# Patient Record
Sex: MALE | Race: WHITE | Employment: UNEMPLOYED | URBAN - METROPOLITAN AREA
[De-identification: names, ages, dates, MRNs, and addresses within clinical notes are randomized per-mention and may not be internally consistent; named-entity substitution may affect disease eponyms.]

---

## 2017-01-13 ENCOUNTER — TELEPHONE (OUTPATIENT)
Dept: INTERNAL MEDICINE CLINIC | Age: 62
End: 2017-01-13

## 2017-01-17 ENCOUNTER — TELEPHONE (OUTPATIENT)
Dept: INTERNAL MEDICINE CLINIC | Age: 62
End: 2017-01-17

## 2017-01-17 NOTE — TELEPHONE ENCOUNTER
Writer received feedback from Dr. Mandeep Thao, the Ultram has been discontinued and Tylenol 500 mg q8 hours has been prescribed.

## 2017-02-06 ENCOUNTER — OFFICE VISIT (OUTPATIENT)
Dept: INTERNAL MEDICINE CLINIC | Age: 62
End: 2017-02-06

## 2017-02-06 VITALS
TEMPERATURE: 99.1 F | BODY MASS INDEX: 21.95 KG/M2 | RESPIRATION RATE: 16 BRPM | WEIGHT: 156.8 LBS | OXYGEN SATURATION: 97 % | HEIGHT: 71 IN | SYSTOLIC BLOOD PRESSURE: 129 MMHG | HEART RATE: 78 BPM | DIASTOLIC BLOOD PRESSURE: 59 MMHG

## 2017-02-06 DIAGNOSIS — I48.19 PERSISTENT ATRIAL FIBRILLATION (HCC): ICD-10-CM

## 2017-02-06 DIAGNOSIS — Z86.73 HISTORY OF CVA (CEREBROVASCULAR ACCIDENT): ICD-10-CM

## 2017-02-06 DIAGNOSIS — I25.5 ISCHEMIC CARDIOMYOPATHY: ICD-10-CM

## 2017-02-06 DIAGNOSIS — E11.59 DM TYPE 2 CAUSING VASCULAR DISEASE (HCC): Primary | ICD-10-CM

## 2017-02-06 DIAGNOSIS — M19.011 PRIMARY OSTEOARTHRITIS OF RIGHT SHOULDER: ICD-10-CM

## 2017-02-06 DIAGNOSIS — F32.9 REACTIVE DEPRESSION: ICD-10-CM

## 2017-02-06 RX ORDER — TRAMADOL HYDROCHLORIDE 50 MG/1
50 TABLET ORAL
Qty: 120 TAB | Refills: 5 | Status: SHIPPED | OUTPATIENT
Start: 2017-02-06 | End: 2017-03-07

## 2017-02-06 RX ORDER — TRAMADOL HYDROCHLORIDE 50 MG/1
TABLET ORAL
Refills: 5 | COMMUNITY
Start: 2016-12-30 | End: 2017-02-06 | Stop reason: SDUPTHER

## 2017-02-06 RX ORDER — METOPROLOL SUCCINATE 25 MG/1
25 TABLET, EXTENDED RELEASE ORAL DAILY
Qty: 90 TAB | Refills: 1 | Status: SHIPPED | OUTPATIENT
Start: 2017-02-06 | End: 2017-05-08 | Stop reason: SDUPTHER

## 2017-02-06 NOTE — PROGRESS NOTES
HISTORY OF PRESENT ILLNESS  Shawn Dunlap is a 59 y.o. male. HPI  Here for dm. He is well controlled only using meds three times weekly. He has been depressed but elected not to use celexa. He needs a refill. He has right shoulder pain using two tramadol daily. He has aphasia from a cva and caretaker provides much of the history. His appetite is poor and he has lost weight. Past Medical History   Diagnosis Date    AF (atrial fibrillation) (HCC)     CAD (coronary artery disease)     Cardiomyopathy (HCC)     Diabetes (HCC)      type 2    Hypercholesterolemia     Memory loss     Swallowing difficulty      Current Outpatient Prescriptions   Medication Sig    traMADol (ULTRAM) 50 mg tablet Take 1 Tab by mouth every six (6) hours as needed for Pain. Max Daily Amount: 200 mg.    metoprolol succinate (TOPROL-XL) 25 mg XL tablet Take 1 Tab by mouth daily.  magnesium oxide (MAG-OX) 400 mg tablet TAKE 1 TAB BY MOUTH DAILY.  warfarin (COUMADIN) 7.5 mg tablet TAKE 1 TAB BY MOUTH DAILY.  glucose blood VI test strips (CONTOUR NEXT STRIPS) strip E11.9 daily    aspirin delayed-release 81 mg tablet Take 81 mg by mouth every evening.  digoxin (LANOXIN) 0.25 mg tablet Take 0.25 mg by mouth daily.  metFORMIN ER (GLUCOPHAGE XR) 500 mg tablet Take 1 Tab by mouth daily (after breakfast).  clotrimazole-betamethasone (LOTRISONE) topical cream Apply  to affected area daily as needed for Skin Irritation (on feet).  digoxin (LANOXIN) 0.25 mg tablet TAKE 1 TAB BY MOUTH DAILY.  lidocaine (LIDODERM) 5 % Apply patch to the affected area for 12 hours a day and remove for 12 hours a day.  fluticasone (FLONASE) 50 mcg/actuation nasal spray 1-2 Sprays by Both Nostrils route daily as needed for Rhinitis. No current facility-administered medications for this visit. Review of Systems   All other systems reviewed and are negative.     Visit Vitals    /59 (BP 1 Location: Left arm, BP Patient Position: Sitting)    Pulse 78    Temp 99.1 °F (37.3 °C) (Axillary)    Resp 16    Ht 5' 11\" (1.803 m)    Wt 156 lb 12.8 oz (71.1 kg)    SpO2 97%    BMI 21.87 kg/m2       Physical Exam   Constitutional: He appears well-developed and well-nourished. Neurological: He is alert. Smiles but no verbalizations. Psychiatric: He has a normal mood and affect. Nursing note and vitals reviewed. ASSESSMENT and PLAN  Diagnoses and all orders for this visit:    DM type 2 causing vascular disease ([facility] [address]  -     METABOLIC PANEL, COMPREHENSIVE  -     HEMOGLOBIN A1C WITH EAG  The current medical regimen is effective;  continue present plan and medications. Persistent atrial fibrillation (HCC)  -     PROTHROMBIN TIME + INR; Future  -     metoprolol succinate (TOPROL-XL) 25 mg XL tablet; Take 1 Tab by mouth daily. History of CVA (cerebrovascular accident)  Significant residual neurologic impairment. Ischemic cardiomyopathy    Reactive depression  Consider starting celexa with weight loss. Primary osteoarthritis of right shoulder  -     traMADol (ULTRAM) 50 mg tablet; Take 1 Tab by mouth every six (6) hours as needed for Pain.  Max Daily Amount: 200 mg.  -     REFERRAL TO ORTHOPEDIC SURGERY    Reviewed plan of care with the patient who has provided input and agrees with the goals

## 2017-02-06 NOTE — MR AVS SNAPSHOT
Visit Information Date & Time Provider Department Dept. Phone Encounter #  
 2/6/2017 11:00 AM Kt Garber MD [facility] [phone] [id number] Your Appointments 3/1/2017  1:00 PM  
ESTABLISHED PATIENT with Gilberto Gonsalez MD  
[facility] ([address]) Appt Note: 6 mo fu appt [address] [facility] [address] [facility] [address]  
[phone]  
  
   
 [address] [facility] [address]  
  
    
 3/1/2017  1:15 PM  
PACEMAKER with PACEMAKER, STFRANCES  
[facility] (ANTHONY SCHEDULING) Appt Note: ford sci icd/stf/miguel angel also [address] [facility] [address] [facility] [address]  
[phone]  
  
   
 [address] [facility] [address] [facility] [address]  
  
    
 6/28/2017 10:40 AM  
ESTABLISHED PATIENT with Tommy Cooper MD  
[facility] (ANTHONY SCHEDULING) Appt Note: ANNUAL; ANNUAL  
 [address] [facility] [address] [place] [address]  
[address] [facility] [place] [address] [address] Upcoming Health Maintenance Date Due Hepatitis C Screening 1955 FOOT EXAM Q1 4/25/1963 EYE EXAM RETINAL OR DILATED Q1 4/25/1963 COLONOSCOPY 4/25/1971 DTaP/Tdap/Td series (1 - Tdap) 4/25/1974 ZOSTER VACCINE AGE 60> 4/25/2013 MICROALBUMIN Q1 6/2/2015 HEMOGLOBIN A1C Q6M 4/4/2017 LIPID PANEL Q1 10/4/2017 Allergies as of 2/6/2017  Review Complete On: 2/6/2017 By: Logan Escalante LPN Severity Noted Reaction Type Reactions Lipitor [Atorvastatin]  10/04/2016    Myalgia Current Immunizations  Reviewed on 10/4/2016 Name Date Influenza Vaccine 10/4/2016, 11/18/2015 Influenza Vaccine (Quad) PF 11/18/2015 11:33 AM  
 Pneumococcal Polysaccharide (PPSV-23) 3/30/2016 12:41 PM  
  
 Not reviewed this visit You Were Diagnosed With   
  
 Codes Comments DM type 2 causing vascular disease ([facility] [address]    -  Primary ICD-10-CM: E11.59 
ICD-9-CM: 250.70, 443.81 Persistent atrial fibrillation (HCC)     ICD-10-CM: I48.1 ICD-9-CM: 427.31 History of CVA (cerebrovascular accident)     ICD-10-CM: Z86.73 
ICD-9-CM: V12.54 Ischemic cardiomyopathy     ICD-10-CM: I25.5 ICD-9-CM: 414.8 Reactive depression     ICD-10-CM: F32.9 ICD-9-CM: 300.4 Primary osteoarthritis of right shoulder     ICD-10-CM: M19.011 
ICD-9-CM: 715.11 Vitals BP Pulse Temp Resp Height(growth percentile) 129/59 (BP 1 Location: Left arm, BP Patient Position: Sitting) 78 99.1 °F (37.3 °C) (Axillary) 16 5' 11\" (1.803 m) Weight(growth percentile) SpO2 BMI Smoking Status 156 lb 12.8 oz (71.1 kg) 97% 21.87 kg/m2 Former Smoker Vitals History BMI and BSA Data Body Mass Index Body Surface Area  
 21.87 kg/m 2 1.89 m 2 Preferred Pharmacy Pharmacy Name Phone [facility] [address] [phone] Your Updated Medication List  
  
   
This list is accurate as of: 2/6/17 11:37 AM.  Always use your most recent med list.  
  
  
  
  
 aspirin delayed-release 81 mg tablet Take 81 mg by mouth every evening. clotrimazole-betamethasone topical cream  
Commonly known as:  Rosilyn Scarlet Apply  to affected area daily as needed for Skin Irritation (on feet). * digoxin 0.25 mg tablet Commonly known as:  LANOXIN Take 0.25 mg by mouth daily. * digoxin 0.25 mg tablet Commonly known as:  LANOXIN  
TAKE 1 TAB BY MOUTH DAILY. fluticasone 50 mcg/actuation nasal spray Commonly known as:  FLONASE  
1-2 Sprays by Both Nostrils route daily as needed for Rhinitis. glucose blood VI test strips strip Commonly known as:  CONTOUR NEXT STRIPS  
E11.9 daily  
  
 lidocaine 5 % Commonly known as:  Rebecca Hilliard Apply patch to the affected area for 12 hours a day and remove for 12 hours a day. magnesium oxide 400 mg tablet Commonly known as:  MAG-OX  
TAKE 1 TAB BY MOUTH DAILY. metFORMIN  mg tablet Commonly known as:  GLUCOPHAGE XR Take 1 Tab by mouth daily (after breakfast). metoprolol succinate 25 mg XL tablet Commonly known as:  TOPROL-XL Take 1 Tab by mouth daily. traMADol 50 mg tablet Commonly known as:  ULTRAM  
Take 1 Tab by mouth every six (6) hours as needed for Pain. Max Daily Amount: 200 mg.  
  
 warfarin 7.5 mg tablet Commonly known as:  COUMADIN  
TAKE 1 TAB BY MOUTH DAILY. * Notice: This list has 2 medication(s) that are the same as other medications prescribed for you. Read the directions carefully, and ask your doctor or other care provider to review them with you. Prescriptions Printed Refills  
 traMADol (ULTRAM) 50 mg tablet 5 Sig: Take 1 Tab by mouth every six (6) hours as needed for Pain. Max Daily Amount: 200 mg. Class: Print Route: Oral  
  
Prescriptions Sent to Pharmacy Refills  
 metoprolol succinate (TOPROL-XL) 25 mg XL tablet 1 Sig: Take 1 Tab by mouth daily. Class: Normal  
 Pharmacy: [place], [place] - [address] Ph #: [phone] Route: Oral  
  
We Performed the Following HEMOGLOBIN A1C WITH EAG [61690 CPT(R)] METABOLIC PANEL, COMPREHENSIVE [24716 CPT(R)] REFERRAL TO ORTHOPEDIC SURGERY [REF62 Custom] Comments:  
 Please evaluate patient for shoulder. To-Do List   
 02/06/2017 Lab:  PROTHROMBIN TIME + INR Referral Information Referral ID Referred By Referred To  
  
 [id number] [facility]   
   [address] [facility] [address] [place], [facility] Manisha Delcid Visits Status Start Date End Date 1 New Request 2/6/17 2/6/18 If your referral has a status of pending review or denied, additional information will be sent to support the outcome of this decision. Introducing [facility]! Tyra Garay introduces Webspy patient portal. Now you can access parts of your medical record, email your doctor's office, and request medication refills online. 1. In your internet browser, go to [email or web address] 2. Click on the First Time User? Click Here link in the Sign In box. You will see the New Member Sign Up page. 3. Enter your Webspy Access Code exactly as it appears below. You will not need to use this code after youve completed the sign-up process. If you do not sign up before the expiration date, you must request a new code. · Webspy Access Code: [email or web address] Expires: 5/7/2017 11:37 AM 
 
4. Enter the last four digits of your Social Security Number (xxxx) and Date of Birth (mm/dd/yyyy) as indicated and click Submit. You will be taken to the next sign-up page. 5. Create a Webspy ID. This will be your Webspy login ID and cannot be changed, so think of one that is secure and easy to remember. 6. Create a Webspy password. You can change your password at any time. 7. Enter your Password Reset Question and Answer. This can be used at a later time if you forget your password. 8. Enter your e-mail address. You will receive e-mail notification when new information is available in [address] 9. Click Sign Up. You can now view and download portions of your medical record. 10. Click the Download Summary menu link to download a portable copy of your medical information. If you have questions, please visit the Frequently Asked Questions section of the Webspy website. Remember, Webspy is NOT to be used for urgent needs. For medical emergencies, dial 911. Now available from your iPhone and Android! Please provide this summary of care documentation to your next provider. Your primary care clinician is listed as [address] [place] [address] [address]. If you have any questions after today's visit, please call [phone].

## 2017-02-07 LAB
ALBUMIN SERPL-MCNC: 3.7 G/DL (ref 3.6–4.8)
ALBUMIN/GLOB SERPL: 1.1 {RATIO} (ref 1.1–2.5)
ALP SERPL-CCNC: 44 IU/L (ref 39–117)
ALT SERPL-CCNC: 10 IU/L (ref 0–44)
AST SERPL-CCNC: 19 IU/L (ref 0–40)
BILIRUB SERPL-MCNC: 0.8 MG/DL (ref 0–1.2)
BUN SERPL-MCNC: 10 MG/DL (ref 8–27)
BUN/CREAT SERPL: 14 (ref 10–22)
CALCIUM SERPL-MCNC: 8.8 MG/DL (ref 8.6–10.2)
CHLORIDE SERPL-SCNC: 100 MMOL/L (ref 96–106)
CO2 SERPL-SCNC: 27 MMOL/L (ref 18–29)
CREAT SERPL-MCNC: 0.74 MG/DL (ref 0.76–1.27)
EST. AVERAGE GLUCOSE BLD GHB EST-MCNC: 137 MG/DL
GLOBULIN SER CALC-MCNC: 3.4 G/DL (ref 1.5–4.5)
GLUCOSE SERPL-MCNC: 112 MG/DL (ref 65–99)
HBA1C MFR BLD: 6.4 % (ref 4.8–5.6)
INR PPP: 3.7 (ref 0.8–1.2)
POTASSIUM SERPL-SCNC: 3.9 MMOL/L (ref 3.5–5.2)
PROT SERPL-MCNC: 7.1 G/DL (ref 6–8.5)
PROTHROMBIN TIME: 37.9 SEC (ref 9.1–12)
SODIUM SERPL-SCNC: 143 MMOL/L (ref 134–144)

## 2017-02-07 NOTE — PROGRESS NOTES
DM perfect. Coumadin too high. Hold three doses then go 1/2 tab MWF whole tab other days.  See me two weeks to retest.

## 2017-02-08 NOTE — PROGRESS NOTES
Writer spoke with patients wife and notified her Per Dr Deanna Brown. Coumadin too high. Hold three doses then go 1/2 tab MWF whole tab other days.  See in the office in two weeks to retest. Patient wife expressed verbal understanding

## 2017-02-24 ENCOUNTER — TELEPHONE (OUTPATIENT)
Dept: INTERNAL MEDICINE CLINIC | Age: 62
End: 2017-02-24

## 2017-02-24 NOTE — TELEPHONE ENCOUNTER
Would like to have order for home PT for leg/arm weakness. Evaluation and treat. States he can hardly get around now. Would like [facility] to [facility] please.  Phone number [phone]

## 2017-02-24 NOTE — TELEPHONE ENCOUNTER
Please call Annah Sicard  About getting the pt requesting order for PT from  [facility]    [phone]    Annah Sicard # [phone]

## 2017-02-25 ENCOUNTER — APPOINTMENT (OUTPATIENT)
Dept: GENERAL RADIOLOGY | Age: 62
End: 2017-02-25
Attending: EMERGENCY MEDICINE
Payer: MEDICAID

## 2017-02-25 ENCOUNTER — HOSPITAL ENCOUNTER (EMERGENCY)
Age: 62
Discharge: HOME | End: 2017-02-25
Attending: EMERGENCY MEDICINE
Payer: MEDICAID

## 2017-02-25 VITALS
HEIGHT: 71 IN | WEIGHT: 155 LBS | RESPIRATION RATE: 20 BRPM | TEMPERATURE: 97.5 F | BODY MASS INDEX: 21.7 KG/M2 | DIASTOLIC BLOOD PRESSURE: 81 MMHG | OXYGEN SATURATION: 100 % | SYSTOLIC BLOOD PRESSURE: 110 MMHG | HEART RATE: 102 BPM

## 2017-02-25 DIAGNOSIS — L03.114 CELLULITIS OF LEFT UPPER EXTREMITY: Primary | ICD-10-CM

## 2017-02-25 LAB
ALBUMIN SERPL BCP-MCNC: 3.7 G/DL (ref 3.5–5)
ALBUMIN/GLOB SERPL: 0.7 {RATIO} (ref 1.1–2.2)
ALP SERPL-CCNC: 53 U/L (ref 45–117)
ALT SERPL-CCNC: 21 U/L (ref 12–78)
ANION GAP BLD CALC-SCNC: 11 MMOL/L (ref 5–15)
AST SERPL W P-5'-P-CCNC: 23 U/L (ref 15–37)
BASOPHILS # BLD AUTO: 0 K/UL (ref 0–0.1)
BASOPHILS # BLD: 0 % (ref 0–1)
BILIRUB SERPL-MCNC: 0.9 MG/DL (ref 0.2–1)
BUN SERPL-MCNC: 20 MG/DL (ref 6–20)
BUN/CREAT SERPL: 24 (ref 12–20)
CALCIUM SERPL-MCNC: 8.9 MG/DL (ref 8.5–10.1)
CHLORIDE SERPL-SCNC: 100 MMOL/L (ref 97–108)
CO2 SERPL-SCNC: 27 MMOL/L (ref 21–32)
CREAT SERPL-MCNC: 0.84 MG/DL (ref 0.7–1.3)
CRP SERPL-MCNC: 4.76 MG/DL
EOSINOPHIL # BLD: 0 K/UL (ref 0–0.4)
EOSINOPHIL NFR BLD: 1 % (ref 0–7)
ERYTHROCYTE [DISTWIDTH] IN BLOOD BY AUTOMATED COUNT: 12.9 % (ref 11.5–14.5)
ERYTHROCYTE [SEDIMENTATION RATE] IN BLOOD: 43 MM/HR (ref 0–20)
GLOBULIN SER CALC-MCNC: 5.1 G/DL (ref 2–4)
GLUCOSE SERPL-MCNC: 136 MG/DL (ref 65–100)
HCT VFR BLD AUTO: 42.3 % (ref 36.6–50.3)
HGB BLD-MCNC: 14.3 G/DL (ref 12.1–17)
LYMPHOCYTES # BLD AUTO: 18 % (ref 12–49)
LYMPHOCYTES # BLD: 1.3 K/UL (ref 0.8–3.5)
MCH RBC QN AUTO: 30.2 PG (ref 26–34)
MCHC RBC AUTO-ENTMCNC: 33.8 G/DL (ref 30–36.5)
MCV RBC AUTO: 89.4 FL (ref 80–99)
MONOCYTES # BLD: 0.7 K/UL (ref 0–1)
MONOCYTES NFR BLD AUTO: 10 % (ref 5–13)
NEUTS SEG # BLD: 5.3 K/UL (ref 1.8–8)
NEUTS SEG NFR BLD AUTO: 71 % (ref 32–75)
PLATELET # BLD AUTO: 165 K/UL (ref 150–400)
POTASSIUM SERPL-SCNC: 4.6 MMOL/L (ref 3.5–5.1)
PROT SERPL-MCNC: 8.8 G/DL (ref 6.4–8.2)
RBC # BLD AUTO: 4.73 M/UL (ref 4.1–5.7)
SODIUM SERPL-SCNC: 138 MMOL/L (ref 136–145)
WBC # BLD AUTO: 7.4 K/UL (ref 4.1–11.1)

## 2017-02-25 PROCEDURE — 74011000250 HC RX REV CODE- 250: Performed by: EMERGENCY MEDICINE

## 2017-02-25 PROCEDURE — 96365 THER/PROPH/DIAG IV INF INIT: CPT

## 2017-02-25 PROCEDURE — 85025 COMPLETE CBC W/AUTO DIFF WBC: CPT | Performed by: EMERGENCY MEDICINE

## 2017-02-25 PROCEDURE — 74011250636 HC RX REV CODE- 250/636: Performed by: EMERGENCY MEDICINE

## 2017-02-25 PROCEDURE — 74011000258 HC RX REV CODE- 258: Performed by: EMERGENCY MEDICINE

## 2017-02-25 PROCEDURE — 99283 EMERGENCY DEPT VISIT LOW MDM: CPT

## 2017-02-25 PROCEDURE — 85652 RBC SED RATE AUTOMATED: CPT | Performed by: EMERGENCY MEDICINE

## 2017-02-25 PROCEDURE — 86140 C-REACTIVE PROTEIN: CPT | Performed by: EMERGENCY MEDICINE

## 2017-02-25 PROCEDURE — 80053 COMPREHEN METABOLIC PANEL: CPT | Performed by: EMERGENCY MEDICINE

## 2017-02-25 PROCEDURE — 36415 COLL VENOUS BLD VENIPUNCTURE: CPT | Performed by: EMERGENCY MEDICINE

## 2017-02-25 PROCEDURE — 75810000289 HC I&D ABSCESS SIMP/COMP/MULT

## 2017-02-25 PROCEDURE — 73090 X-RAY EXAM OF FOREARM: CPT

## 2017-02-25 RX ORDER — CEPHALEXIN 500 MG/1
500 CAPSULE ORAL 4 TIMES DAILY
Qty: 28 CAP | Refills: 0 | Status: SHIPPED | OUTPATIENT
Start: 2017-02-25 | End: 2017-03-04

## 2017-02-25 RX ORDER — BUPIVACAINE HYDROCHLORIDE 5 MG/ML
5 INJECTION, SOLUTION EPIDURAL; INTRACAUDAL ONCE
Status: COMPLETED | OUTPATIENT
Start: 2017-02-25 | End: 2017-02-25

## 2017-02-25 RX ADMIN — CEFAZOLIN SODIUM 2 G: 1 INJECTION, POWDER, FOR SOLUTION INTRAMUSCULAR; INTRAVENOUS at 21:01

## 2017-02-25 RX ADMIN — SODIUM CHLORIDE 1000 ML: 900 INJECTION, SOLUTION INTRAVENOUS at 21:01

## 2017-02-25 RX ADMIN — BUPIVACAINE HYDROCHLORIDE 25 MG: 5 INJECTION, SOLUTION EPIDURAL; INTRACAUDAL at 21:01

## 2017-02-25 NOTE — Clinical Note
We hope that we have addressed all of your medical concerns. The examination and treatment you received in the Emergency Department were for an emergent problem and were not intended as complete care. It is important that you follow up with your [facility] provider(s) for ongoing care. If your symptoms worsen or do not improve as expected, and you are unable to reach your usual health care provider(s), you should return to the Emergency Department. Today's healthcare is undergoing tremendous  change, and patient satisfaction surveys are one of the many tools to assess the quality of medical care. You may receive a survey from the [employer] regarding your experience in the Emergency Department. I hope that your experience has  been completely positive, particularly the medical care that I provided. As such, please participate in the survey; anything less than excellent does not meet my expectations or intentions. [facility] participate in nationally recognized quality of care measures. If your blood pressure is greater than 120/80, as reported below, we urge that you seek medical care to address the potential of high blood pressure, commonly known as hypertensio n. Hypertension can be hereditary or can be caused by certain medical conditions, pain, stress, or \"white coat syndrome. \" Please make an appointment with your health care provider(s) for follow up of your Emergency Department visit. VITALS:   
Empty flowsheet group. Thank you for allowing us to provide you with medical care today. We realize that you have many choices for your emergency care needs. Please choose us in the future for any continued health care needs. Alexis Watson Epley, [address]  
Office: [phone] @[facility]NT([facility])@ @[place] [address]@

## 2017-02-26 NOTE — ED PROVIDER NOTES
HPI Comments: 59 y.o. male with past medical history significant for Afib, CAD, Stroke, Aphasia, Dysphagia who presents from home accompanied by significant other with chief complaint of abscess. Pt aphasic s/p CVA, hx provided by significant other. Per significant other, pt with left arm swelling x \"awhile\" with noted abscess to forearm. Pt has been scratching significantly. + Blood thinners. Tetanus UTD. Significant other denies for patient: fever, chills, nausea or vomiting. There are no other acute medical concerns at this time. Full history, physical exam, and ROS unable to be obtained due to:  Aphasia. PCP: Rosamaria Rodas MD    Note written by Sivan Chavez, as dictated by Kiran Adams MD 7:43 PM    The history is provided by a significant other. The history is limited by the condition of the patient. No  was used. Past Medical History:   Diagnosis Date    AF (atrial fibrillation) (HCC)     CAD (coronary artery disease)     Cardiomyopathy ([facility] [address]     Diabetes ([facility] [address]     type 2    Hypercholesterolemia     Memory loss     Swallowing difficulty        Past Surgical History:   Procedure Laterality Date    CARDIAC SURG PROCEDURE UNLIST  2006    ablation    HX HEART CATHETERIZATION  2005    diffuse 40-50%    HX PACEMAKER      HX PACEMAKER PLACEMENT Left March 2016    HX UROLOGICAL      circumcision    HX WISDOM TEETH EXTRACTION  1980s    3 removed         Family History:   Problem Relation Age of Onset    Diabetes Father     Neuropathy Father     Heart Disease Father     Cancer Father     Stroke Father     Hypertension Sister     Stroke Mother        Social History     Social History    Marital status: SINGLE     Spouse name: N/A    Number of children: N/A    Years of education: N/A     Occupational History    Not on file.      Social History Main Topics    Smoking status: Former Smoker     Packs/day: 0.25     Years: 3.00     Quit date: 1/1/1972  Smokeless tobacco: Never Used    Alcohol use No    Drug use: No    Sexual activity: Yes     Other Topics Concern    Not on file     Social History Narrative         ALLERGIES: Lipitor [atorvastatin]    Review of Systems   Reason unable to perform ROS: Aphasia. Vitals:    02/25/17 1918   BP: 132/80   Pulse: (!) 102   Resp: 20   Temp: 97.5 °F (36.4 °C)   SpO2: 97%   Weight: 70.3 kg (155 lb)   Height: 5' 11\" (1.803 m)            Physical Exam   Constitutional: He appears well-developed and well-nourished. No distress. Non-verbal after stroke, noted abscess/ cellulitic area to L forearm;    HENT:   Head: Normocephalic and atraumatic. Nose: Nose normal.   Mouth/Throat: Oropharynx is clear and moist.   Eyes: Right eye exhibits no discharge. Left eye exhibits no discharge. Keeps his eyes closed; Neck: Normal range of motion. Neck supple. Cardiovascular: Regular rhythm, normal heart sounds and intact distal pulses. Exam reveals no gallop and no friction rub. No murmur heard. Just tachardic   Pulmonary/Chest: Effort normal and breath sounds normal. No respiratory distress. He has no wheezes. He has no rales. He exhibits no tenderness. Abdominal: Soft. Bowel sounds are normal. He exhibits no distension and no mass. There is no tenderness. There is no rebound and no guarding. No wincing w/ palpation;    Musculoskeletal: Normal range of motion. He exhibits no edema. Lymphadenopathy:     He has no cervical adenopathy. Skin: Skin is warm and dry. Rash noted. No erythema. Nursing note and vitals reviewed. [facility]  ED Course       Procedures          Procedure Note - Incision and Drainage:   8:31 PM  Performed by: Thor Alba MD  Complexity: simple  Skin prepped with Betadine. Sterile field established. Anesthesia achieved with 4 mLs of Bupivacaine 0.5% without epinephrine using a local infiltration.    Abscess to arm: left was incised with # 11 blade, and 1mLs of mucopurulent drainage was expressed. Wound probed and irrigated. Area was not packed. Sterile dressing applied. Estimated blood loss: 3cc  The procedure took 1-15 minutes, and pt tolerated well. 9:05 PM discussed results w/ sig other/ sister; they agree w/ plans; Louie DI Alvarado's  results have been reviewed with him. He has been counseled regarding his diagnosis. He verbally conveys understanding and agreement of the signs, symptoms, diagnosis, treatment and prognosis and additionally agrees to Call/ Arrange follow up as recommended with Dr. Parker Reid MD in 24 - 48 hours. He also agrees with the care-plan and conveys that all of his questions have been answered. I have also put together some discharge instructions for him that include: 1) educational information regarding their diagnosis, 2) how to care for their diagnosis at home, as well a 3) list of reasons why they would want to return to the ED prior to their follow-up appointment, should their condition change or for concerns.

## 2017-02-26 NOTE — DISCHARGE INSTRUCTIONS
Cellulitis: Care Instructions  Your Care Instructions    Cellulitis is a skin infection. It often occurs after a break in the skin from a scrape, cut, bite, or puncture, or after a rash. The doctor has checked you carefully, but problems can develop later. If you notice any problems or new symptoms, get medical treatment right away. Follow-up care is a key part of your treatment and safety. Be sure to make and go to all appointments, and call your doctor if you are having problems. It's also a good idea to know your test results and keep a list of the medicines you take. How can you care for yourself at home? · Take your antibiotics as directed. Do not stop taking them just because you feel better. You need to take the full course of antibiotics. · Prop up the infected area on pillows to reduce pain and swelling. Try to keep the area above the level of your heart as often as you can. · If your doctor told you how to care for your wound, follow your doctor's instructions. If you did not get instructions, follow this general advice:  ¨ Wash the wound with clean water 2 times a day. Don't use hydrogen peroxide or alcohol, which can slow healing. ¨ You may cover the wound with a thin layer of petroleum jelly, such as Vaseline, and a nonstick bandage. ¨ Apply more petroleum jelly and replace the bandage as needed. · Be safe with medicines. Take pain medicines exactly as directed. ¨ If the doctor gave you a prescription medicine for pain, take it as prescribed. ¨ If you are not taking a prescription pain medicine, ask your doctor if you can take an over-the-counter medicine. To prevent cellulitis in the future  · Try to prevent cuts, scrapes, or other injuries to your skin. Cellulitis most often occurs where there is a break in the skin. · If you get a scrape, cut, mild burn, or bite, wash the wound with clean water as soon as you can to help avoid infection.  Don't use hydrogen peroxide or alcohol, which can slow healing. · If you have swelling in your legs (edema), support stockings and good skin care may help prevent leg sores and cellulitis. · Take care of your feet, especially if you have diabetes or other conditions that increase the risk of infection. Wear shoes and socks. Do not go barefoot. If you have athlete's foot or other skin problems on your feet, talk to your doctor about how to treat them. When should you call for help? Call your doctor now or seek immediate medical care if:  · You have signs that your infection is getting worse, such as:  ¨ Increased pain, swelling, warmth, or redness. ¨ Red streaks leading from the area. ¨ Pus draining from the area. ¨ A fever. · You get a rash. Watch closely for changes in your health, and be sure to contact your doctor if:  · You are not getting better after 1 day (24 hours). · You do not get better as expected. Where can you learn more? Go to http://brigida-thomas.info/. Costa Sawyer in the search box to learn more about \"Cellulitis: Care Instructions. \"  Current as of: February 5, 2016  Content Version: 11.1  © 6563-0772 [employer]. Care instructions adapted under license by [facility] (which disclaims liability or warranty for this information). If you have questions about a medical condition or this instruction, always ask your healthcare professional. [place] [address] any warranty or liability for your use of this information. We hope that we have addressed all of your medical concerns. The examination and treatment you received in the Emergency Department were for an emergent problem and were not intended as complete care. It is important that you follow up with your healthcare provider(s) for ongoing care. If your symptoms worsen or do not improve as expected, and you are unable to reach your usual health care provider(s), you should return to the Emergency Department. Today's healthcare is undergoing tremendous change, and patient satisfaction surveys are one of the many tools to assess the quality of medical care. You may receive a survey from the [facility] regarding your experience in the Emergency Department. I hope that your experience has been completely positive, particularly the medical care that I provided. As such, please participate in the survey; anything less than excellent does not meet my expectations or intentions. [facility] and [facility] participate in nationally recognized quality of care measures. If your blood pressure is greater than 120/80, as reported below, we urge that you seek medical care to address the potential of high blood pressure, commonly known as hypertension. Hypertension can be hereditary or can be caused by certain medical conditions, pain, stress, or \"white coat syndrome. \"       Please make an appointment with your health care provider(s) for follow up of your Emergency Department visit. VITALS:   Patient Vitals for the past 8 hrs:   Temp Pulse Resp BP SpO2   02/25/17 1918 97.5 °F (36.4 °C) (!) 102 20 132/80 97 %          Thank you for allowing us to provide you with medical care today. We realize that you have many choices for your emergency care needs. Please choose us in the future for any continued health care needs. [address], [address] [facility].   Office: [phone]            Recent Results (from the past 24 hour(s))   CBC WITH AUTOMATED DIFF    Collection Time: 02/25/17  7:58 PM   Result Value Ref Range    WBC 7.4 4.1 - 11.1 K/uL    RBC 4.73 4.10 - 5.70 M/uL    HGB 14.3 12.1 - 17.0 g/dL    HCT 42.3 36.6 - 50.3 %    MCV 89.4 80.0 - 99.0 FL    MCH 30.2 26.0 - 34.0 PG    MCHC 33.8 30.0 - 36.5 g/dL    RDW 12.9 11.5 - 14.5 %    PLATELET 364 640 - 589 K/uL    NEUTROPHILS 71 32 - 75 %    LYMPHOCYTES 18 12 - 49 %    MONOCYTES 10 5 - 13 %    EOSINOPHILS 1 0 - 7 %    BASOPHILS 0 0 - 1 %    ABS. NEUTROPHILS 5.3 1.8 - 8.0 K/UL    ABS. LYMPHOCYTES 1.3 0.8 - 3.5 K/UL    ABS. MONOCYTES 0.7 0.0 - 1.0 K/UL    ABS. EOSINOPHILS 0.0 0.0 - 0.4 K/UL    ABS. BASOPHILS 0.0 0.0 - 0.1 K/UL   METABOLIC PANEL, COMPREHENSIVE    Collection Time: 02/25/17  7:58 PM   Result Value Ref Range    Sodium 138 136 - 145 mmol/L    Potassium 4.6 3.5 - 5.1 mmol/L    Chloride 100 97 - 108 mmol/L    CO2 27 21 - 32 mmol/L    Anion gap 11 5 - 15 mmol/L    Glucose 136 (H) 65 - 100 mg/dL    BUN 20 6 - 20 MG/DL    Creatinine 0.84 0.70 - 1.30 MG/DL    BUN/Creatinine ratio 24 (H) 12 - 20      GFR est AA >60 >60 ml/min/1.73m2    GFR est non-AA >60 >60 ml/min/1.73m2    Calcium 8.9 8.5 - 10.1 MG/DL    Bilirubin, total 0.9 0.2 - 1.0 MG/DL    ALT (SGPT) 21 12 - 78 U/L    AST (SGOT) 23 15 - 37 U/L    Alk. phosphatase 53 45 - 117 U/L    Protein, total 8.8 (H) 6.4 - 8.2 g/dL    Albumin 3.7 3.5 - 5.0 g/dL    Globulin 5.1 (H) 2.0 - 4.0 g/dL    A-G Ratio 0.7 (L) 1.1 - 2.2     C REACTIVE PROTEIN, QT    Collection Time: 02/25/17  7:58 PM   Result Value Ref Range    C-Reactive protein 4.76 (H) <0.60 mg/dL   SED RATE (ESR)    Collection Time: 02/25/17  7:58 PM   Result Value Ref Range    Sed rate, automated 43 (H) 0 - 20 mm/hr       Xr Forearm Lt Ap/lat    Result Date: 2/25/2017  EXAM:  XR FOREARM LT AP/LAT INDICATION:   infxn/ r/o free air. Left arm swelling, history of abscesses COMPARISON: None. FINDINGS: Two views of the left radius and ulna demonstrate no fracture or dislocation. There is soft tissue swelling. No evidence of subcutaneous emphysema. .     IMPRESSION:  Soft tissue swelling.

## 2017-02-26 NOTE — ED TRIAGE NOTES
Patient arrives with family with c/o LEFT arm swelling, per family patient has a hx of abscesses, patient has had this abscess on his arm for 1 week now and its not healing.

## 2017-02-27 ENCOUNTER — PATIENT OUTREACH (OUTPATIENT)
Dept: INTERNAL MEDICINE CLINIC | Age: 62
End: 2017-02-27

## 2017-02-27 ENCOUNTER — TELEPHONE (OUTPATIENT)
Dept: INTERNAL MEDICINE CLINIC | Age: 62
End: 2017-02-27

## 2017-02-27 NOTE — TELEPHONE ENCOUNTER
Notified Danyelle Rodas that Dr Vadim Olsen would like to see the patient this week for an INR check since he is on an antibiotic following an ER visit. She states that she is unable to get him in this week since she needs help. He is on the schedule for 03/07/17. The patient has home health coming out on Wednesday. Spoke with [facility] and they can complete an INR. Faxed order to [phone] per Dr Vadim Olsen and notified Danyelle Rodas.

## 2017-02-27 NOTE — PROGRESS NOTES
NNTOCED    Patient is listed on  Cali Abbott report for ED visit to [facility]  for left arm abscess. I&D performed and discharged with Rx for Keflex. According to Dr. Princess Reyna note on , he would like patient to be seen this week for f/u and PT/INR. Contacted patient's girlfriend/POA Fabricio  and introduced self and NN role. Verified patient's . She reports she has spoken to someone else in this office today and told her that she is unable to bring the patient in this week. Confirmed appointment next Tuesday 3/7 at 10:15 am. Reports whomever she spoke with was going to contact the [facility] Yonatan [facility], [facility], to set up home PT/INR check this week. She confirmed that the patient is taking Keflex liquid form QID. He is doing okay and reports cause of abscess is MRSA. She performed wound care today according to discharge instructions. Washed with soap and water and applied a bandage. Reports she was told to allow the wound to scab over. Advised her NN will follow up with whomever she spoke with in this office and ensure that [facility] is going to draw PT/INR. She verbalized understanding. After call ended, discussed patient with Elena Goldstein, as she spoke to Lauren. Confirmed that she obtained verbal order from Dr. Willow Layne to have home health draw PT/INR, so she will notify home health. Plan to follow up with patient and Lauren at appointment next week to assess for any further needs. According to notes in the chart, patient has Medicaid care aides for 35 hours per week. [place] and her sister assist the patient with other care.

## 2017-03-01 ENCOUNTER — TELEPHONE (OUTPATIENT)
Dept: INTERNAL MEDICINE CLINIC | Age: 62
End: 2017-03-01

## 2017-03-01 NOTE — TELEPHONE ENCOUNTER
Writer spoke with Cady Tirado and notified her Per Dr Veronica Minaya to half tab twice weekly and test one week. Whole tab five days weekly.  Cady Tirado understood

## 2017-03-01 NOTE — TELEPHONE ENCOUNTER
Called Talya and verified that the INR is 1.5. Patient is currently taking 7.5 mg coumadin, except on Monday, Wednesday, and Friday he takes half tab of 7.5 mg. Needs to know if any adjustment needs to be made. Call back number [phone].

## 2017-03-07 ENCOUNTER — OFFICE VISIT (OUTPATIENT)
Dept: INTERNAL MEDICINE CLINIC | Age: 62
End: 2017-03-07

## 2017-03-07 VITALS
SYSTOLIC BLOOD PRESSURE: 104 MMHG | TEMPERATURE: 97.8 F | HEIGHT: 71 IN | RESPIRATION RATE: 14 BRPM | HEART RATE: 60 BPM | DIASTOLIC BLOOD PRESSURE: 64 MMHG

## 2017-03-07 DIAGNOSIS — L03.114 CELLULITIS OF LEFT ARM: ICD-10-CM

## 2017-03-07 DIAGNOSIS — I48.91 ATRIAL FIBRILLATION, UNSPECIFIED TYPE (HCC): Primary | ICD-10-CM

## 2017-03-07 DIAGNOSIS — R53.83 LETHARGY: ICD-10-CM

## 2017-03-07 DIAGNOSIS — E11.59 DM TYPE 2 CAUSING VASCULAR DISEASE (HCC): ICD-10-CM

## 2017-03-07 DIAGNOSIS — Z86.73 HISTORY OF CVA (CEREBROVASCULAR ACCIDENT): ICD-10-CM

## 2017-03-07 LAB
INR BLD: 1.6
PT POC: NORMAL SEC
VALID INTERNAL CONTROL?: YES

## 2017-03-07 RX ORDER — CITALOPRAM 20 MG/1
TABLET, FILM COATED ORAL
Refills: 1 | COMMUNITY
Start: 2017-02-06 | End: 2017-04-26

## 2017-03-07 RX ORDER — CEPHALEXIN 250 MG/5ML
POWDER, FOR SUSPENSION ORAL
Refills: 0 | COMMUNITY
Start: 2017-02-26 | End: 2017-04-02

## 2017-03-07 NOTE — PROGRESS NOTES
1. Have you been to the ER, urgent care clinic since your last visit? Hospitalized since your last visit? [facility] ER for MRSA. 2. Have you seen or consulted any other health care providers outside of the [address] [place] [address] since your last visit? Include any pap smears or colon screening.  No     Chief Complaint   Patient presents with    Diabetes    Cholesterol Problem    Skin Problem     MRSA on left arm- taking antibiotic     Not fasting

## 2017-03-07 NOTE — PROGRESS NOTES
HISTORY OF PRESENT ILLNESS  Shamika Ruffin is a 59 y.o. male. HPI  Here for cellulitis. He was seen in the er and is doing well with keflex. He has been lethargic for a month with no response to celexa. He remains on coumadin for af due for a pt inr  Using 1/2 tab twice weekly whole tab other days. He has had prior cva. He has home health pt now. He uses tramadol for pain in his right shoulder. He rarely requires metformin now. Girlfriend/caretaker looking into doctor who makes house calls but we will continue care until this is available. Past Medical History:   Diagnosis Date    AF (atrial fibrillation) (HCC)     CAD (coronary artery disease)     Cardiomyopathy (HCC)     Diabetes (HCC)     type 2    Hypercholesterolemia     Memory loss     Swallowing difficulty      Current Outpatient Prescriptions   Medication Sig    citalopram (CELEXA) 20 mg tablet     cephALEXin (KEFLEX) 250 mg/5 mL suspension TAKE 2 TEASPOONS BY MOUTH 4 TIMES A DAY FOR 7 DAYS. DISCARD REMAINING.  traMADol (ULTRAM) 50 mg tablet Take 1 Tab by mouth every six (6) hours as needed for Pain. Max Daily Amount: 200 mg.    metoprolol succinate (TOPROL-XL) 25 mg XL tablet Take 1 Tab by mouth daily.  magnesium oxide (MAG-OX) 400 mg tablet TAKE 1 TAB BY MOUTH DAILY.  digoxin (LANOXIN) 0.25 mg tablet TAKE 1 TAB BY MOUTH DAILY.  warfarin (COUMADIN) 7.5 mg tablet TAKE 1 TAB BY MOUTH DAILY.  glucose blood VI test strips (CONTOUR NEXT STRIPS) strip E11.9 daily    aspirin delayed-release 81 mg tablet Take 81 mg by mouth every evening.  fluticasone (FLONASE) 50 mcg/actuation nasal spray 1-2 Sprays by Both Nostrils route daily as needed for Rhinitis.  metFORMIN ER (GLUCOPHAGE XR) 500 mg tablet Take 1 Tab by mouth daily (after breakfast).  clotrimazole-betamethasone (LOTRISONE) topical cream Apply  to affected area daily as needed for Skin Irritation (on feet).     lidocaine (LIDODERM) 5 % Apply patch to the affected area for 12 hours a day and remove for 12 hours a day. No current facility-administered medications for this visit. Review of Systems   All other systems reviewed and are negative. Visit Vitals    /64 (BP 1 Location: Left arm, BP Patient Position: At rest)    Pulse 60    Temp 97.8 °F (36.6 °C) (Oral)    Resp 14    Ht 5' 11\" (1.803 m)       Physical Exam   Constitutional: He appears well-developed and well-nourished. Neurological:   Lethargic but arousable. Much more alert by time he left probably sleeping initially. Skin:   Left forearm with no swelling or erythema . Shallow ulcer seen, laterally. Nursing note and vitals reviewed. Results for orders placed or performed in visit on 03/07/17   AMB POC PT/INR   Result Value Ref Range    VALID INTERNAL CONTROL POC Yes     Prothrombin time (POC)  sec    INR POC 1.6      Lab Results   Component Value Date/Time    Hemoglobin A1c 6.4 02/06/2017 11:44 AM       ASSESSMENT and PLAN  Louie was seen today for diabetes, cholesterol problem and skin problem. Diagnoses and all orders for this visit:    Atrial fibrillation, unspecified type ([address]  -     AMB POC PT/INR  Use whole tablet daily and retest one week. Cellulitis of left arm  Resolved. Lethargy  Stop tramadol and use tylenol for pain. PT working with him, too. History of CVA (cerebrovascular accident)  At baseline. DM type 2 causing vascular disease ([address]   The current medical regimen is effective;  continue present plan and medications.       Reviewed plan of care with the patient who has provided input and agrees with the goals

## 2017-03-08 ENCOUNTER — TELEPHONE (OUTPATIENT)
Dept: INTERNAL MEDICINE CLINIC | Age: 62
End: 2017-03-08

## 2017-03-08 NOTE — TELEPHONE ENCOUNTER
Spoke to St. l[facility] from [facility] and wanted to know when patient's PT/INR was supposed to be re-checked. Looked at office notes from yesterday's visit and Dr. Mandeep Thao had stated to recheck in one week. Rubina verbalized understanding.

## 2017-03-13 ENCOUNTER — TELEPHONE (OUTPATIENT)
Dept: INTERNAL MEDICINE CLINIC | Age: 62
End: 2017-03-13

## 2017-03-14 NOTE — TELEPHONE ENCOUNTER
Left detailed message per Dr You Fry. Advised to take an extra 1/2 pill twice a week since he has 7.5 mg tabs. Repeat the INR in 1 week. Asked for a return call to the office with any further questions.

## 2017-03-21 ENCOUNTER — TELEPHONE (OUTPATIENT)
Dept: INTERNAL MEDICINE CLINIC | Age: 62
End: 2017-03-21

## 2017-03-21 NOTE — TELEPHONE ENCOUNTER
Skip one tablet weekly and retest in one week ( he is on a whole tab daily now so he will use 6 weekly).

## 2017-03-21 NOTE — TELEPHONE ENCOUNTER
Spoke to Juanita from  Home care and she stated patient is currently taking 7.5 mg on Wed and Fri and 5 mg on all other days. Spoke with Dr. Meagan Fraser and he stated to take 7.5 mg once a week, and 5 mg all other days. Juanita verbalized understanding.

## 2017-03-27 ENCOUNTER — TELEPHONE (OUTPATIENT)
Dept: INTERNAL MEDICINE CLINIC | Age: 62
End: 2017-03-27

## 2017-03-27 ENCOUNTER — PATIENT OUTREACH (OUTPATIENT)
Dept: INTERNAL MEDICINE CLINIC | Age: 62
End: 2017-03-27

## 2017-03-27 NOTE — PROGRESS NOTES
Patient has completed 30 day transition of care. Attended follow up appointment with Dr. Amberly Betts. No other needs identified to Nurse Navigator at this time. Resolving episode.

## 2017-03-27 NOTE — TELEPHONE ENCOUNTER
Rubina from [facility] called with pt PT/INR on 7.5 mg daily 11.25 except Wed/Fri  Last check 1.73/13/2017    PT/42.9     INR/3.6    # [phone]

## 2017-03-27 NOTE — TELEPHONE ENCOUNTER
Spoke with Rubina from [place] [address]. Advised per Dr. Arrieta Meter to hold coumadin MWF use 7.5 other days and to recheck PT/INR in 1 week. [facility] will let the family know. Leanna she advised that home health will end on March 31. And that a doctor will come to see patient. Family is also trying to get a PT/INR machine .

## 2017-04-02 ENCOUNTER — APPOINTMENT (OUTPATIENT)
Dept: GENERAL RADIOLOGY | Age: 62
End: 2017-04-02
Attending: NURSE PRACTITIONER
Payer: MEDICAID

## 2017-04-02 ENCOUNTER — HOSPITAL ENCOUNTER (EMERGENCY)
Age: 62
Discharge: HOME | End: 2017-04-02
Attending: EMERGENCY MEDICINE | Admitting: EMERGENCY MEDICINE
Payer: MEDICAID

## 2017-04-02 VITALS
TEMPERATURE: 97.6 F | DIASTOLIC BLOOD PRESSURE: 90 MMHG | HEIGHT: 71 IN | HEART RATE: 134 BPM | OXYGEN SATURATION: 100 % | RESPIRATION RATE: 30 BRPM | BODY MASS INDEX: 21.7 KG/M2 | WEIGHT: 155 LBS | SYSTOLIC BLOOD PRESSURE: 121 MMHG

## 2017-04-02 DIAGNOSIS — R05.9 COUGH: Primary | ICD-10-CM

## 2017-04-02 LAB
ALBUMIN SERPL BCP-MCNC: 3.3 G/DL (ref 3.5–5)
ALBUMIN/GLOB SERPL: 0.8 {RATIO} (ref 1.1–2.2)
ALP SERPL-CCNC: 52 U/L (ref 45–117)
ALT SERPL-CCNC: 21 U/L (ref 12–78)
ANION GAP BLD CALC-SCNC: 5 MMOL/L (ref 5–15)
APPEARANCE UR: CLEAR
APTT PPP: 32.4 SEC (ref 22.1–32.5)
AST SERPL W P-5'-P-CCNC: 23 U/L (ref 15–37)
BACTERIA URNS QL MICRO: NEGATIVE /HPF
BASOPHILS # BLD AUTO: 0 K/UL (ref 0–0.1)
BASOPHILS # BLD: 0 % (ref 0–1)
BILIRUB SERPL-MCNC: 0.6 MG/DL (ref 0.2–1)
BILIRUB UR QL: NEGATIVE
BNP SERPL-MCNC: 1191 PG/ML (ref 0–125)
BUN SERPL-MCNC: 11 MG/DL (ref 6–20)
BUN/CREAT SERPL: 14 (ref 12–20)
CALCIUM SERPL-MCNC: 8.6 MG/DL (ref 8.5–10.1)
CHLORIDE SERPL-SCNC: 106 MMOL/L (ref 97–108)
CO2 SERPL-SCNC: 32 MMOL/L (ref 21–32)
COLOR UR: NORMAL
CREAT SERPL-MCNC: 0.78 MG/DL (ref 0.7–1.3)
DIGOXIN SERPL-MCNC: 0.3 NG/ML (ref 0.9–2)
EOSINOPHIL # BLD: 0.1 K/UL (ref 0–0.4)
EOSINOPHIL NFR BLD: 2 % (ref 0–7)
EPITH CASTS URNS QL MICRO: NORMAL /LPF
ERYTHROCYTE [DISTWIDTH] IN BLOOD BY AUTOMATED COUNT: 13 % (ref 11.5–14.5)
GLOBULIN SER CALC-MCNC: 4.3 G/DL (ref 2–4)
GLUCOSE SERPL-MCNC: 106 MG/DL (ref 65–100)
GLUCOSE UR STRIP.AUTO-MCNC: NEGATIVE MG/DL
HCT VFR BLD AUTO: 41 % (ref 36.6–50.3)
HGB BLD-MCNC: 13.3 G/DL (ref 12.1–17)
HGB UR QL STRIP: NEGATIVE
HYALINE CASTS URNS QL MICRO: NORMAL /LPF (ref 0–5)
INR PPP: 1.5 (ref 0.9–1.1)
KETONES UR QL STRIP.AUTO: NEGATIVE MG/DL
LACTATE BLD-SCNC: 2.5 MMOL/L (ref 0.4–2)
LACTATE SERPL-SCNC: 2.2 MMOL/L (ref 0.4–2)
LEUKOCYTE ESTERASE UR QL STRIP.AUTO: NEGATIVE
LYMPHOCYTES # BLD AUTO: 34 % (ref 12–49)
LYMPHOCYTES # BLD: 1.5 K/UL (ref 0.8–3.5)
MCH RBC QN AUTO: 29.5 PG (ref 26–34)
MCHC RBC AUTO-ENTMCNC: 32.4 G/DL (ref 30–36.5)
MCV RBC AUTO: 90.9 FL (ref 80–99)
MONOCYTES # BLD: 0.4 K/UL (ref 0–1)
MONOCYTES NFR BLD AUTO: 9 % (ref 5–13)
NEUTS SEG # BLD: 2.5 K/UL (ref 1.8–8)
NEUTS SEG NFR BLD AUTO: 55 % (ref 32–75)
NITRITE UR QL STRIP.AUTO: NEGATIVE
PH UR STRIP: 7 [PH] (ref 5–8)
PLATELET # BLD AUTO: 113 K/UL (ref 150–400)
POTASSIUM SERPL-SCNC: 3.9 MMOL/L (ref 3.5–5.1)
PROT SERPL-MCNC: 7.6 G/DL (ref 6.4–8.2)
PROT UR STRIP-MCNC: NEGATIVE MG/DL
PROTHROMBIN TIME: 15.4 SEC (ref 9–11.1)
RBC # BLD AUTO: 4.51 M/UL (ref 4.1–5.7)
RBC #/AREA URNS HPF: NORMAL /HPF (ref 0–5)
SODIUM SERPL-SCNC: 143 MMOL/L (ref 136–145)
SP GR UR REFRACTOMETRY: 1.01 (ref 1–1.03)
THERAPEUTIC RANGE,PTTT: NORMAL SECS (ref 58–77)
TROPONIN I SERPL-MCNC: 0.04 NG/ML
UROBILINOGEN UR QL STRIP.AUTO: 0.2 EU/DL (ref 0.2–1)
WBC # BLD AUTO: 4.5 K/UL (ref 4.1–11.1)
WBC URNS QL MICRO: NORMAL /HPF (ref 0–4)

## 2017-04-02 PROCEDURE — 96360 HYDRATION IV INFUSION INIT: CPT

## 2017-04-02 PROCEDURE — 83605 ASSAY OF LACTIC ACID: CPT

## 2017-04-02 PROCEDURE — 85610 PROTHROMBIN TIME: CPT | Performed by: NURSE PRACTITIONER

## 2017-04-02 PROCEDURE — 81001 URINALYSIS AUTO W/SCOPE: CPT | Performed by: NURSE PRACTITIONER

## 2017-04-02 PROCEDURE — 93005 ELECTROCARDIOGRAM TRACING: CPT

## 2017-04-02 PROCEDURE — 83880 ASSAY OF NATRIURETIC PEPTIDE: CPT | Performed by: NURSE PRACTITIONER

## 2017-04-02 PROCEDURE — 85025 COMPLETE CBC W/AUTO DIFF WBC: CPT | Performed by: NURSE PRACTITIONER

## 2017-04-02 PROCEDURE — 85730 THROMBOPLASTIN TIME PARTIAL: CPT | Performed by: NURSE PRACTITIONER

## 2017-04-02 PROCEDURE — 71010 XR CHEST PORT: CPT

## 2017-04-02 PROCEDURE — 74011250636 HC RX REV CODE- 250/636: Performed by: NURSE PRACTITIONER

## 2017-04-02 PROCEDURE — 83605 ASSAY OF LACTIC ACID: CPT | Performed by: NURSE PRACTITIONER

## 2017-04-02 PROCEDURE — 80162 ASSAY OF DIGOXIN TOTAL: CPT | Performed by: NURSE PRACTITIONER

## 2017-04-02 PROCEDURE — 80053 COMPREHEN METABOLIC PANEL: CPT | Performed by: NURSE PRACTITIONER

## 2017-04-02 PROCEDURE — 99285 EMERGENCY DEPT VISIT HI MDM: CPT

## 2017-04-02 PROCEDURE — 36415 COLL VENOUS BLD VENIPUNCTURE: CPT | Performed by: NURSE PRACTITIONER

## 2017-04-02 PROCEDURE — 87040 BLOOD CULTURE FOR BACTERIA: CPT | Performed by: NURSE PRACTITIONER

## 2017-04-02 PROCEDURE — 84484 ASSAY OF TROPONIN QUANT: CPT | Performed by: NURSE PRACTITIONER

## 2017-04-02 RX ORDER — ALBUTEROL SULFATE 90 UG/1
2 AEROSOL, METERED RESPIRATORY (INHALATION)
Qty: 1 INHALER | Refills: 0 | Status: SHIPPED | OUTPATIENT
Start: 2017-04-02

## 2017-04-02 RX ORDER — AMOXICILLIN AND CLAVULANATE POTASSIUM 875; 125 MG/1; MG/1
1 TABLET, FILM COATED ORAL 2 TIMES DAILY
Qty: 20 TAB | Refills: 0 | Status: SHIPPED | OUTPATIENT
Start: 2017-04-02 | End: 2017-04-12

## 2017-04-02 RX ADMIN — SODIUM CHLORIDE 1000 ML: 900 INJECTION, SOLUTION INTRAVENOUS at 15:01

## 2017-04-02 NOTE — DISCHARGE INSTRUCTIONS
Cough: Care Instructions  Your Care Instructions  A cough is your body's response to something that bothers your throat or airways. Many things can cause a cough. You might cough because of a cold or the flu, bronchitis, or asthma. Smoking, postnasal drip, allergies, and stomach acid that backs up into your throat also can cause coughs. A cough is a symptom, not a disease. Most coughs stop when the cause, such as a cold, goes away. You can take a few steps at home to cough less and feel better. Follow-up care is a key part of your treatment and safety. Be sure to make and go to all appointments, and call your doctor if you are having problems. It's also a good idea to know your test results and keep a list of the medicines you take. How can you care for yourself at home? · Drink lots of water and other fluids. This helps thin the mucus and soothes a dry or sore throat. Honey or lemon juice in hot water or tea may ease a dry cough. · Take cough medicine as directed by your doctor. · Prop up your head on pillows to help you breathe and ease a dry cough. · Try cough drops to soothe a dry or sore throat. Cough drops don't stop a cough. Medicine-flavored cough drops are no better than candy-flavored drops or hard candy. · Do not smoke. Avoid secondhand smoke. If you need help quitting, talk to your doctor about stop-smoking programs and medicines. These can increase your chances of quitting for good. When should you call for help? Call 911 anytime you think you may need emergency care. For example, call if:  · You have severe trouble breathing. Call your doctor now or seek immediate medical care if:  · You cough up blood. · You have new or worse trouble breathing. · You have a new or higher fever. · You have a new rash.   Watch closely for changes in your health, and be sure to contact your doctor if:  · You cough more deeply or more often, especially if you notice more mucus or a change in the color of your mucus. · You have new symptoms, such as a sore throat, an earache, or sinus pain. · You do not get better as expected. Where can you learn more? Go to http://brigida-thomas.info/. Enter D279 in the search box to learn more about \"Cough: Care Instructions. \"  Current as of: May 27, 2016  Content Version: 11.2  © 6233-6493 [employer]. Care instructions adapted under license by [employer] (which disclaims liability or warranty for this information). If you have questions about a medical condition or this instruction, always ask your healthcare professional. [place] [address] any warranty or liability for your use of this information. We hope that we have addressed all of your medical concerns. The examination and treatment you received in the Emergency Department were for an emergent problem and were not intended as complete care. It is important that you follow up with your healthcare provider(s) for ongoing care. If your symptoms worsen or do not improve as expected, and you are unable to reach your usual health care provider(s), you should return to the Emergency Department. Today's healthcare is undergoing tremendous change, and patient satisfaction surveys are one of the many tools to assess the quality of medical care. You may receive a survey from the [employer] regarding your experience in the Emergency Department. I hope that your experience has been completely positive, particularly the medical care that I provided. As such, please participate in the survey; anything less than excellent does not meet my expectations or intentions. [facility] and [address] [place] [address] participate in nationally recognized quality of care measures.   If your blood pressure is greater than 120/80, as reported below, we urge that you seek medical care to address the potential of high blood pressure, commonly known as hypertension. Hypertension can be hereditary or can be caused by certain medical conditions, pain, stress, or \"white coat syndrome. \"       Please make an appointment with your health care provider(s) for follow up of your Emergency Department visit. VITALS:   Patient Vitals for the past 8 hrs:   Temp Pulse Resp BP SpO2   04/02/17 1746 - (!) 110 21 105/80 100 %   04/02/17 1700 - (!) 110 20 111/88 -   04/02/17 1645 - 95 25 114/89 97 %   04/02/17 1630 - 90 19 118/89 100 %   04/02/17 1615 - 85 14 108/77 -   04/02/17 1600 - (!) 106 16 112/86 -   04/02/17 1545 - (!) 120 9 (!) 116/91 -   04/02/17 1530 - (!) 113 21 (!) 115/93 100 %   04/02/17 1515 - (!) 112 15 (!) 116/104 -   04/02/17 1500 - 96 19 122/82 -   04/02/17 1445 - (!) 117 18 (!) 113/93 -   04/02/17 1441 97.6 °F (36.4 °C) - - - -   04/02/17 1439 - 100 18 - 98 %   04/02/17 1438 - - - 105/79 99 %   04/02/17 1434 - 94 16 105/79 99 %          Thank you for allowing us to provide you with medical care today. We realize that you have many choices for your emergency care needs. Please choose us in the future for any continued health care needs. Asha Negrete, [address] [place], [place] [address]y [address].   Office: [phone]            Recent Results (from the past 24 hour(s))   METABOLIC PANEL, COMPREHENSIVE    Collection Time: 04/02/17  2:51 PM   Result Value Ref Range    Sodium 143 136 - 145 mmol/L    Potassium 3.9 3.5 - 5.1 mmol/L    Chloride 106 97 - 108 mmol/L    CO2 32 21 - 32 mmol/L    Anion gap 5 5 - 15 mmol/L    Glucose 106 (H) 65 - 100 mg/dL    BUN 11 6 - 20 MG/DL    Creatinine 0.78 0.70 - 1.30 MG/DL    BUN/Creatinine ratio 14 12 - 20      GFR est AA >60 >60 ml/min/1.73m2    GFR est non-AA >60 >60 ml/min/1.73m2    Calcium 8.6 8.5 - 10.1 MG/DL    Bilirubin, total 0.6 0.2 - 1.0 MG/DL    ALT (SGPT) 21 12 - 78 U/L    AST (SGOT) 23 15 - 37 U/L    Alk.  phosphatase 52 45 - 117 U/L    Protein, total 7.6 6.4 - 8.2 g/dL    Albumin 3.3 (L) 3.5 - 5.0 g/dL    Globulin 4.3 (H) 2.0 - 4.0 g/dL    A-G Ratio 0.8 (L) 1.1 - 2.2     CBC WITH AUTOMATED DIFF    Collection Time: 04/02/17  2:51 PM   Result Value Ref Range    WBC 4.5 4.1 - 11.1 K/uL    RBC 4.51 4.10 - 5.70 M/uL    HGB 13.3 12.1 - 17.0 g/dL    HCT 41.0 36.6 - 50.3 %    MCV 90.9 80.0 - 99.0 FL    MCH 29.5 26.0 - 34.0 PG    MCHC 32.4 30.0 - 36.5 g/dL    RDW 13.0 11.5 - 14.5 %    PLATELET 885 (L) 222 - 400 K/uL    NEUTROPHILS 55 32 - 75 %    LYMPHOCYTES 34 12 - 49 %    MONOCYTES 9 5 - 13 %    EOSINOPHILS 2 0 - 7 %    BASOPHILS 0 0 - 1 %    ABS. NEUTROPHILS 2.5 1.8 - 8.0 K/UL    ABS. LYMPHOCYTES 1.5 0.8 - 3.5 K/UL    ABS. MONOCYTES 0.4 0.0 - 1.0 K/UL    ABS. EOSINOPHILS 0.1 0.0 - 0.4 K/UL    ABS.  BASOPHILS 0.0 0.0 - 0.1 K/UL   LACTIC ACID, PLASMA    Collection Time: 04/02/17  2:51 PM   Result Value Ref Range    Lactic acid 2.2 (HH) 0.4 - 2.0 MMOL/L   TROPONIN I    Collection Time: 04/02/17  2:51 PM   Result Value Ref Range    Troponin-I, Qt. 0.04 <0.05 ng/mL   PRO-BNP    Collection Time: 04/02/17  2:51 PM   Result Value Ref Range    NT pro-BNP 1191 (H) 0 - 125 PG/ML   PROTHROMBIN TIME + INR    Collection Time: 04/02/17  2:51 PM   Result Value Ref Range    INR 1.5 (H) 0.9 - 1.1      Prothrombin time 15.4 (H) 9.0 - 11.1 sec   PTT    Collection Time: 04/02/17  2:51 PM   Result Value Ref Range    aPTT 32.4 22.1 - 32.5 sec    aPTT, therapeutic range     58.0 - 77.0 SECS   POC LACTIC ACID    Collection Time: 04/02/17  4:30 PM   Result Value Ref Range    Lactic Acid (POC) 2.5 (HH) 0.4 - 2.0 mmol/L   URINALYSIS W/MICROSCOPIC    Collection Time: 04/02/17  5:19 PM   Result Value Ref Range    Color YELLOW/STRAW      Appearance CLEAR CLEAR      Specific gravity 1.007 1.003 - 1.030      pH (UA) 7.0 5.0 - 8.0      Protein NEGATIVE  NEG mg/dL    Glucose NEGATIVE  NEG mg/dL    Ketone NEGATIVE  NEG mg/dL    Bilirubin NEGATIVE  NEG      Blood NEGATIVE  NEG      Urobilinogen 0.2 0.2 - 1.0 EU/dL Nitrites NEGATIVE  NEG      Leukocyte Esterase NEGATIVE  NEG      WBC 0-4 0 - 4 /hpf    RBC 0-5 0 - 5 /hpf    Epithelial cells FEW FEW /lpf    Bacteria NEGATIVE  NEG /hpf    Hyaline cast 0-2 0 - 5 /lpf       Xr Chest Port    Result Date: 4/2/2017  EXAM:  XR CHEST PORT INDICATION:  Chest pain x1 week and cough x3 weeks. COMPARISON:  6/1/2016. FINDINGS:  An AP portable view was obtained of the chest.  The heart is within normal limits of size with left ICD. The lungs are clear. No consolidation, pulmonary edema or pleural effusion is evident. The regional osseous structures are unremarkable. IMPRESSION: No evidence of pneumonia.

## 2017-04-02 NOTE — ED PROVIDER NOTES
HPI Comments: The pt is a 59year old male who presents to the ED with complaints of moist, productive cough for the last three and chest pain for one week. Pt is essentially non verbal but able to respond the head nods to questions. His SO provides most of the history. No reported fevers, chills, night sweats, chest pain, pressure, SOB, STEELE, PND, orthopnea, abdominal pain, n/v/d, melena, hematuria, dysuria, constipation, HA, dizziness, and syncope. No discharge from ICD. Pt denies pain presently. SO states that she doesn't regularly give him his digoxin. She has been taking his blood pressure with home monitor and will not give him his Toprol XL 25 mg when his blood pressure is low. BPs at home have been trending with SBP btw 90 - 130. Past Medical History:  No date: AF (atrial fibrillation) (HCC)  No date: CAD (coronary artery disease)  No date: Cardiomyopathy ([facility]  No date: Diabetes ([facility]      Comment: type 2  No date: Hypercholesterolemia  No date: Memory loss  No date: Swallowing difficulty    Past Surgical History:  2006: CARDIAC SURG PROCEDURE UNLIST      Comment: ablation  2005: HX HEART CATHETERIZATION      Comment: diffuse 40-50%  No date: HX PACEMAKER  March 2016: HX PACEMAKER PLACEMENT Left  No date: HX UROLOGICAL      Comment: circumcision  46s: HX WISDOM TEETH EXTRACTION      Comment: 3 removed    PCP:  Natividad Galeano MD        Patient is a 59 y.o. male presenting with cough and chest pain. The history is provided by the patient. Cough   This is a new problem. Episode onset: three weeks. Associated symptoms include chest pain. Pertinent negatives include no chills, no eye redness, no ear pain, no headaches, no rhinorrhea, no sore throat, no myalgias, no shortness of breath, no wheezing, no nausea, no vomiting and no confusion. Chest Pain (Angina)    This is a new problem. Episode onset: one week  Associated symptoms include cough.  Pertinent negatives include no abdominal pain, no back pain, no diaphoresis, no dizziness, no fever, no headaches, no nausea, no numbness, no palpitations, no shortness of breath, no vomiting and no weakness. Past Medical History:   Diagnosis Date    AF (atrial fibrillation) (HCC)     CAD (coronary artery disease)     [facility]     [facility]     type 2    Hypercholesterolemia     Memory loss     Swallowing difficulty        Past Surgical History:   Procedure Laterality Date    CARDIAC SURG PROCEDURE UNLIST  2006    ablation    HX HEART CATHETERIZATION  2005    diffuse 40-50%    HX PACEMAKER      HX PACEMAKER PLACEMENT Left March 2016    HX UROLOGICAL      circumcision    HX WISDOM TEETH EXTRACTION  1980s    3 removed         Family History:   Problem Relation Age of Onset    Diabetes Father     Neuropathy Father     Heart Disease Father     Cancer Father     Stroke Father     Hypertension Sister     Stroke Mother        Social History     Social History    Marital status: SINGLE     Spouse name: N/A    Number of children: N/A    Years of education: N/A     Occupational History    Not on file. Social History Main Topics    Smoking status: Former Smoker     Packs/day: 0.25     Years: 3.00     Quit date: 1/1/1972    Smokeless tobacco: Never Used    Alcohol use No    Drug use: No    Sexual activity: Yes     Other Topics Concern    Not on file     Social History Narrative         ALLERGIES: Lipitor [atorvastatin]    Review of Systems   Constitutional: Negative for activity change, appetite change, chills, diaphoresis, fatigue, fever and unexpected weight change. HENT: Negative for congestion, ear pain, rhinorrhea, sinus pressure, sore throat, tinnitus, trouble swallowing and voice change. Eyes: Negative for pain, discharge, redness and visual disturbance. Respiratory: Positive for cough. Negative for apnea, choking, chest tightness, shortness of breath, wheezing and stridor. Cardiovascular: Positive for chest pain. Negative for palpitations and leg swelling. Gastrointestinal: Negative for abdominal pain, constipation, nausea and vomiting. Endocrine: Negative for cold intolerance and heat intolerance. Genitourinary: Negative for difficulty urinating, dysuria, flank pain, hematuria, testicular pain and urgency. Musculoskeletal: Negative for arthralgias, back pain, gait problem, joint swelling, myalgias, neck pain and neck stiffness. Skin: Negative for color change, pallor, rash and wound. Allergic/Immunologic: Negative for immunocompromised state. Neurological: Negative for dizziness, tremors, syncope, weakness, light-headedness, numbness and headaches. Hematological: Does not bruise/bleed easily. Psychiatric/Behavioral: Negative for agitation, confusion and suicidal ideas. Vitals:    04/02/17 1434 04/02/17 1438 04/02/17 1439 04/02/17 1441   BP: 105/79 105/79     Pulse: 94  100    Resp: 16  18    Temp:    97.6 °F (36.4 °C)   SpO2: 99% 99% 98%    Weight: 70.3 kg (155 lb)      Height: 5' 11\" (1.803 m)               Physical Exam   Constitutional: He appears well-developed and well-nourished. No distress. HENT:   Head: Atraumatic. Nose: Nose normal.   Mouth/Throat: No oropharyngeal exudate. Eyes: Conjunctivae and EOM are normal. Right eye exhibits no discharge. Left eye exhibits no discharge. No scleral icterus. Neck: Normal range of motion. Neck supple. No JVD present. No tracheal deviation present. No thyromegaly present. Cardiovascular: Normal heart sounds, intact distal pulses and normal pulses. An irregularly irregular rhythm present. Exam reveals no gallop and no friction rub. No murmur heard. Pulmonary/Chest: No accessory muscle usage or stridor. No respiratory distress. He has no decreased breath sounds. He has no wheezes. He has no rhonchi. He has no rales. He exhibits no tenderness. Abdominal: Soft. Bowel sounds are normal. He exhibits no distension and no mass.  There is no tenderness. There is no rebound and no guarding. Musculoskeletal: Normal range of motion. He exhibits no edema or tenderness. Lymphadenopathy:     He has no cervical adenopathy. Neurological: He is alert. Skin: Skin is warm and dry. He is not diaphoretic. Psychiatric: He has a normal mood and affect. His behavior is normal.   Nursing note and vitals reviewed. MDM  Number of Diagnoses or Management Options  Diagnosis management comments:    * routine laboratory data and UA   * CXR and EKG   * Gentle hydration           Amount and/or Complexity of Data Reviewed  Clinical lab tests: ordered and reviewed  Tests in the radiology section of CPT®: ordered and reviewed  Review and summarize past medical records: yes  Discuss the patient with other providers: yes    Risk of Complications, Morbidity, and/or Mortality  General comments:    - hemodynamically stable pt in NAD   - pt without pain on exam   - will treat URI in the setting of debility   - no acute distress   - Lactic acid mildly elevated one developed in lab and one developed by POC. Consider elevation in conjunction with Metformin use. Discussed with family. SO would like to take him home. Verbalized good return precautions      Patient Progress  Patient progress: stable    ED Course       Procedures        ED EKG interpretation:  Rhythm: atrial fib; and irregular. Rate (approx.): 105; Axis: left axis deviation; P wave: absent; QRS interval: normal ; ST/T wave: non-specific changes; in  Lead: This EKG was interpreted by Edgard Ball NP,ED Provider. 6:16 PM  Pt has been reevaluated. There are no new complaints, changes, or physical findings at this time. Medications have been reviewed w/ pt and/or family. Pt and/or family's questions have been answered. Pt and/or family expressed good understanding of the dx/tx/rx and is in agreement with plan of care.  Pt instructed and agreed to f/u w/ PCP and Cardiology and to return to ED upon further deterioration. Pt is ready for discharge. Encouraged the administration of digoxin. LABORATORY TESTS:  Recent Results (from the past 12 hour(s))   METABOLIC PANEL, COMPREHENSIVE    Collection Time: 04/02/17  2:51 PM   Result Value Ref Range    Sodium 143 136 - 145 mmol/L    Potassium 3.9 3.5 - 5.1 mmol/L    Chloride 106 97 - 108 mmol/L    CO2 32 21 - 32 mmol/L    Anion gap 5 5 - 15 mmol/L    Glucose 106 (H) 65 - 100 mg/dL    BUN 11 6 - 20 MG/DL    Creatinine 0.78 0.70 - 1.30 MG/DL    BUN/Creatinine ratio 14 12 - 20      GFR est AA >60 >60 ml/min/1.73m2    GFR est non-AA >60 >60 ml/min/1.73m2    Calcium 8.6 8.5 - 10.1 MG/DL    Bilirubin, total 0.6 0.2 - 1.0 MG/DL    ALT (SGPT) 21 12 - 78 U/L    AST (SGOT) 23 15 - 37 U/L    Alk. phosphatase 52 45 - 117 U/L    Protein, total 7.6 6.4 - 8.2 g/dL    Albumin 3.3 (L) 3.5 - 5.0 g/dL    Globulin 4.3 (H) 2.0 - 4.0 g/dL    A-G Ratio 0.8 (L) 1.1 - 2.2     CBC WITH AUTOMATED DIFF    Collection Time: 04/02/17  2:51 PM   Result Value Ref Range    WBC 4.5 4.1 - 11.1 K/uL    RBC 4.51 4.10 - 5.70 M/uL    HGB 13.3 12.1 - 17.0 g/dL    HCT 41.0 36.6 - 50.3 %    MCV 90.9 80.0 - 99.0 FL    MCH 29.5 26.0 - 34.0 PG    MCHC 32.4 30.0 - 36.5 g/dL    RDW 13.0 11.5 - 14.5 %    PLATELET 306 (L) 676 - 400 K/uL    NEUTROPHILS 55 32 - 75 %    LYMPHOCYTES 34 12 - 49 %    MONOCYTES 9 5 - 13 %    EOSINOPHILS 2 0 - 7 %    BASOPHILS 0 0 - 1 %    ABS. NEUTROPHILS 2.5 1.8 - 8.0 K/UL    ABS. LYMPHOCYTES 1.5 0.8 - 3.5 K/UL    ABS. MONOCYTES 0.4 0.0 - 1.0 K/UL    ABS. EOSINOPHILS 0.1 0.0 - 0.4 K/UL    ABS.  BASOPHILS 0.0 0.0 - 0.1 K/UL   LACTIC ACID, PLASMA    Collection Time: 04/02/17  2:51 PM   Result Value Ref Range    Lactic acid 2.2 (HH) 0.4 - 2.0 MMOL/L   TROPONIN I    Collection Time: 04/02/17  2:51 PM   Result Value Ref Range    Troponin-I, Qt. 0.04 <0.05 ng/mL   PRO-BNP    Collection Time: 04/02/17  2:51 PM   Result Value Ref Range    NT pro-BNP 1191 (H) 0 - 125 PG/ML   PROTHROMBIN TIME + INR    Collection Time: 04/02/17  2:51 PM   Result Value Ref Range    INR 1.5 (H) 0.9 - 1.1      Prothrombin time 15.4 (H) 9.0 - 11.1 sec   PTT    Collection Time: 04/02/17  2:51 PM   Result Value Ref Range    aPTT 32.4 22.1 - 32.5 sec    aPTT, therapeutic range     58.0 - 77.0 SECS   POC LACTIC ACID    Collection Time: 04/02/17  4:30 PM   Result Value Ref Range    Lactic Acid (POC) 2.5 (HH) 0.4 - 2.0 mmol/L   URINALYSIS W/MICROSCOPIC    Collection Time: 04/02/17  5:19 PM   Result Value Ref Range    Color YELLOW/STRAW      Appearance CLEAR CLEAR      Specific gravity 1.007 1.003 - 1.030      pH (UA) 7.0 5.0 - 8.0      Protein NEGATIVE  NEG mg/dL    Glucose NEGATIVE  NEG mg/dL    Ketone NEGATIVE  NEG mg/dL    Bilirubin NEGATIVE  NEG      Blood NEGATIVE  NEG      Urobilinogen 0.2 0.2 - 1.0 EU/dL    Nitrites NEGATIVE  NEG      Leukocyte Esterase NEGATIVE  NEG      WBC 0-4 0 - 4 /hpf    RBC 0-5 0 - 5 /hpf    Epithelial cells FEW FEW /lpf    Bacteria NEGATIVE  NEG /hpf    Hyaline cast 0-2 0 - 5 /lpf       IMAGING RESULTS:  XR CHEST PORT   Final Result        Xr Chest Port    Result Date: 4/2/2017  EXAM:  XR CHEST PORT INDICATION:  Chest pain x1 week and cough x3 weeks. COMPARISON:  6/1/2016. FINDINGS:  An AP portable view was obtained of the chest.  The heart is within normal limits of size with left ICD. The lungs are clear. No consolidation, pulmonary edema or pleural effusion is evident. The regional osseous structures are unremarkable. IMPRESSION: No evidence of pneumonia. MEDICATIONS GIVEN:  Medications   sodium chloride 0.9 % bolus infusion 1,000 mL (0 mL IntraVENous IV Completed 4/2/17 1601)       IMPRESSION:  1. Cough        PLAN:  1. Discharge Medication List as of 4/2/2017  6:02 PM      START taking these medications    Details   amoxicillin-clavulanate (AUGMENTIN) 875-125 mg per tablet Take 1 Tab by mouth two (2) times a day for 10 days. , Print, Disp-20 Tab, R-0      albuterol (PROVENTIL HFA, VENTOLIN HFA, PROAIR HFA) 90 mcg/actuation inhaler Take 2 Puffs by inhalation every four (4) hours as needed for Wheezing., Print, Disp-1 Inhaler, R-0      inhalational spacing device 1 Each by Does Not Apply route as needed. , Print, Disp-1 Each, R-0         CONTINUE these medications which have NOT CHANGED    Details   metoprolol succinate (TOPROL-XL) 25 mg XL tablet Take 1 Tab by mouth daily. , Normal, Disp-90 Tab, R-1      magnesium oxide (MAG-OX) 400 mg tablet TAKE 1 TAB BY MOUTH DAILY., Normal, Disp-30 Tab, R-6      digoxin (LANOXIN) 0.25 mg tablet TAKE 1 TAB BY MOUTH DAILY., Normal, Disp-90 Tab, R-1      warfarin (COUMADIN) 7.5 mg tablet TAKE 1 TAB BY MOUTH DAILY., Normal, Disp-90 Tab, R-1      glucose blood VI test strips (CONTOUR NEXT STRIPS) strip E11.9 daily, Normal, Disp-50 Strip, R-11      aspirin delayed-release 81 mg tablet Take 81 mg by mouth every evening., Historical Med      metFORMIN ER (GLUCOPHAGE XR) 500 mg tablet Take 1 Tab by mouth daily (after breakfast). , Normal, Disp-90 Tab, R-1      citalopram (CELEXA) 20 mg tablet Historical Med, R-1      lidocaine (LIDODERM) 5 % Apply patch to the affected area for 12 hours a day and remove for 12 hours a day., Normal, Disp-30 Each, R-2      fluticasone (FLONASE) 50 mcg/actuation nasal spray 1-2 Sprays by Both Nostrils route daily as needed for Rhinitis., Historical Med      clotrimazole-betamethasone (LOTRISONE) topical cream Apply  to affected area daily as needed for Skin Irritation (on feet). , Historical Med         STOP taking these medications       cephALEXin (KEFLEX) 250 mg/5 mL suspension Comments:   Reason for Stoppin.   Follow-up Information     Follow up With Details Comments Contact Info    Forrest Dickerson MD In 2 days  [address] [facility] [address] [phone]      [facility] EMERGENCY DEPT  As needed, If symptoms worsen [address]  [phone]        3.  Supportive care Return to ED if worse     Gali Booth NP  6:16 PM

## 2017-04-02 NOTE — ED NOTES
Patient discharged by NP. Patient given the opportunity to ask questions, verbalized understanding of teaching.

## 2017-04-03 LAB
ATRIAL RATE: 138 BPM
CALCULATED R AXIS, ECG10: -54 DEGREES
CALCULATED T AXIS, ECG11: 134 DEGREES
DIAGNOSIS, 93000: NORMAL
Q-T INTERVAL, ECG07: 328 MS
QRS DURATION, ECG06: 84 MS
QTC CALCULATION (BEZET), ECG08: 433 MS
VENTRICULAR RATE, ECG03: 105 BPM

## 2017-04-04 ENCOUNTER — PATIENT OUTREACH (OUTPATIENT)
Dept: INTERNAL MEDICINE CLINIC | Age: 62
End: 2017-04-04

## 2017-04-04 NOTE — PROGRESS NOTES
Patient listed on 24hr discharge from [facility] ED chief complaint CP and cough. Patient discharged home with albuterol inhaler and augmentin, stopped keflex. Per notes patient is nonverbal and debilitated. Patient girlfriend is his primary caregiver. He is on medicaid and has paid caregivers approx 35 hours a week. They may be  changing PCP to MD that would do home visits. Spoke with Denise Cohen she states that  has been seen by at home physician, last week. She is waiting to verify that his visits will be covered by insurance and then she plans on switching to home PCP.  is less mobile and uses a WC most of the time. Transporting him to MD visits has gotten more challenging. Since ED visit, Denise Cohen has gotten his prescriptions filled. He has started his antibx (augmentin) and has gotten his albuterol inhaler which he has only needed once. He is coughing very little now. Denise Cohen is concerned about his BP being low, this is not a new concern and has been an issue for a while. She questions whether she should give him his Toprol XL and she will contact the cardiologist to ask for parameters as to when she should give it. Tala Garcia has a follow up appt scheduled with his cardiologist- for [email or web address].  He is scheduled to have his pacemaker checked at that time. Vikramchris Cohen states she will contact our office when a decision is made reference his PCP. She is given NN information and direct line for follow up.

## 2017-04-08 LAB
BACTERIA SPEC CULT: NORMAL
BACTERIA SPEC CULT: NORMAL
SERVICE CMNT-IMP: NORMAL
SERVICE CMNT-IMP: NORMAL

## 2017-04-26 ENCOUNTER — OFFICE VISIT (OUTPATIENT)
Dept: CARDIOLOGY CLINIC | Age: 62
End: 2017-04-26

## 2017-04-26 ENCOUNTER — CLINICAL SUPPORT (OUTPATIENT)
Dept: CARDIOLOGY CLINIC | Age: 62
End: 2017-04-26

## 2017-04-26 VITALS
HEART RATE: 88 BPM | WEIGHT: 152 LBS | BODY MASS INDEX: 21.28 KG/M2 | SYSTOLIC BLOOD PRESSURE: 86 MMHG | DIASTOLIC BLOOD PRESSURE: 52 MMHG | RESPIRATION RATE: 18 BRPM | HEIGHT: 71 IN

## 2017-04-26 DIAGNOSIS — Z95.810 PRESENCE OF AUTOMATIC CARDIOVERTER/DEFIBRILLATOR (AICD): Primary | ICD-10-CM

## 2017-04-26 DIAGNOSIS — Z95.810 ICD (IMPLANTABLE CARDIOVERTER-DEFIBRILLATOR) IN PLACE: Primary | ICD-10-CM

## 2017-04-26 RX ORDER — WARFARIN SODIUM 5 MG/1
TABLET ORAL
Refills: 0 | COMMUNITY
Start: 2017-04-19 | End: 2017-05-17 | Stop reason: SDUPTHER

## 2017-04-26 NOTE — PROGRESS NOTES
HISTORY OF PRESENTING ILLNESS      Cheryl Andrew is a 59 y.o. male established patient who was admitted for NSTEMI and asystole requiring ACLS which led to [facility] which identified severe multivessel CAD and and LVEF of 10%. He was evaluated by CT surgical teams at [facility] and [facility] and was considered a poor candidate for revascularization. He was also felt to be a poor candidate for PCI per Dr. Leela Gonsalez. He is unable to tolerate beta blockers and ACEi due to borderline hypotension. He has a history of AF with CVA and has been considered a poor candidate for anticoagulation. He has residual aphasia however is able to communicate. He had bradycardia and thus underwent ICD implantation. ICD interrogation revealed an episode of VT at 279bpm on 3/30/16 which was defibrillated successfully at [address] after which we started magnesium supplement. He failed to demonstrate further episodes of VT. He presents today for follow up of these issues. Patient has been doing well. Device interrogation reveals normal device functioning.      ACTIVE PROBLEM LIST     Patient Active Problem List    Diagnosis Date Noted    Statin intolerance 10/04/2016    DM type 2 causing vascular disease ([facility] [address] 05/04/2016    Debilitated patient 05/04/2016    History of CVA (cerebrovascular accident) 03/28/2016    Dyslipidemia 02/22/2016    Depression 11/18/2015    CAD (coronary artery disease)     Ischemic cardiomyopathy     Atrial fibrillation (HCC)            PAST MEDICAL HISTORY     Past Medical History:   Diagnosis Date    AF (atrial fibrillation) (HCC)     CAD (coronary artery disease)     Cardiomyopathy ([facility]     Diabetes ([facility]     type 2    Hypercholesterolemia     Memory loss     Swallowing difficulty            PAST SURGICAL HISTORY     Past Surgical History:   Procedure Laterality Date    CARDIAC SURG PROCEDURE UNLIST  2006    ablation    HX HEART CATHETERIZATION  2005    diffuse 40-50%    HX PACEMAKER      HX PACEMAKER PLACEMENT Left March 2016    HX UROLOGICAL      circumcision    HX WISDOM TEETH EXTRACTION  1980s    3 removed          ALLERGIES     Allergies   Allergen Reactions    Lipitor [Atorvastatin] Myalgia          FAMILY HISTORY     Family History   Problem Relation Age of Onset    Diabetes Father     Neuropathy Father     Heart Disease Father     Cancer Father     Stroke Father     Hypertension Sister     Stroke Mother     negative for cardiac disease       SOCIAL HISTORY     Social History     Social History    Marital status: SINGLE     Spouse name: N/A    Number of children: N/A    Years of education: N/A     Social History Main Topics    Smoking status: Former Smoker     Packs/day: 0.25     Years: 3.00     Quit date: 1/1/1972    Smokeless tobacco: Never Used    Alcohol use No    Drug use: No    Sexual activity: Yes     Other Topics Concern    Not on file     Social History Narrative         MEDICATIONS     Current Outpatient Prescriptions   Medication Sig    albuterol (PROVENTIL HFA, VENTOLIN HFA, PROAIR HFA) 90 mcg/actuation inhaler Take 2 Puffs by inhalation every four (4) hours as needed for Wheezing.  inhalational spacing device 1 Each by Does Not Apply route as needed.  citalopram (CELEXA) 20 mg tablet     metoprolol succinate (TOPROL-XL) 25 mg XL tablet Take 1 Tab by mouth daily.  magnesium oxide (MAG-OX) 400 mg tablet TAKE 1 TAB BY MOUTH DAILY.  digoxin (LANOXIN) 0.25 mg tablet TAKE 1 TAB BY MOUTH DAILY.  warfarin (COUMADIN) 7.5 mg tablet TAKE 1 TAB BY MOUTH DAILY.  glucose blood VI test strips (CONTOUR NEXT STRIPS) strip E11.9 daily    aspirin delayed-release 81 mg tablet Take 81 mg by mouth every evening.  fluticasone (FLONASE) 50 mcg/actuation nasal spray 1-2 Sprays by Both Nostrils route daily as needed for Rhinitis.  metFORMIN ER (GLUCOPHAGE XR) 500 mg tablet Take 1 Tab by mouth daily (after breakfast).     clotrimazole-betamethasone (LOTRISONE) topical cream Apply  to affected area daily as needed for Skin Irritation (on feet). No current facility-administered medications for this visit. I have reviewed the nurses notes, vitals, problem list, allergy list, medical history, family, social history and medications. REVIEW OF SYMPTOMS      General: Aphasic     PHYSICAL EXAMINATION      There were no vitals filed for this visit. General: Well developed, in no acute distress. HEENT: No jaundice, oral mucosa moist, no oral ulcers  Neck: Supple, no stiffness, no lymphadenopathy, supple  Heart:  Normal S1/S2 negative S3 or S4. Regular, no murmur, gallop or rub, no jugular venous distention  Respiratory: Clear bilaterally x 4, no wheezing or rales  Abdomen:   Soft, non-tender, bowel sounds are active.   Extremities:  No edema, normal cap refill, no cyanosis. Musculoskeletal: No clubbing, no deformities  Neuro: A&Ox3, speech clear, gait stable, cooperative, no focal neurologic deficits  Skin: Skin color is normal. No rashes or lesions. Non diaphoretic, moist.  Vascular: 2+ pulses symmetric in all extremities       DIAGNOSTIC DATA      EKG:        LABORATORY DATA      Lab Results   Component Value Date/Time    WBC 4.5 04/02/2017 02:51 PM    HGB 13.3 04/02/2017 02:51 PM    HCT 41.0 04/02/2017 02:51 PM    PLATELET 114 18/30/8608 02:51 PM    MCV 90.9 04/02/2017 02:51 PM      Lab Results   Component Value Date/Time    Sodium 143 04/02/2017 02:51 PM    Potassium 3.9 04/02/2017 02:51 PM    Chloride 106 04/02/2017 02:51 PM    CO2 32 04/02/2017 02:51 PM    Anion gap 5 04/02/2017 02:51 PM    Glucose 106 04/02/2017 02:51 PM    BUN 11 04/02/2017 02:51 PM    Creatinine 0.78 04/02/2017 02:51 PM    BUN/Creatinine ratio 14 04/02/2017 02:51 PM    GFR est AA >60 04/02/2017 02:51 PM    GFR est non-AA >60 04/02/2017 02:51 PM    Calcium 8.6 04/02/2017 02:51 PM    Bilirubin, total 0.6 04/02/2017 02:51 PM    AST (SGOT) 23 04/02/2017 02:51 PM    Alk.  phosphatase 52 04/02/2017 02:51 PM Protein, total 7.6 04/02/2017 02:51 PM    Albumin 3.3 04/02/2017 02:51 PM    Globulin 4.3 04/02/2017 02:51 PM    A-G Ratio 0.8 04/02/2017 02:51 PM    ALT (SGPT) 21 04/02/2017 02:51 PM           ASSESSMENT      1. Ventricular tachycardia  2. Coronary artery disease, native    A. Poor candidate for revascularization  3. Cardiomyopathy    A. LVEF 10%  4. Atrial fibrillation  5. CVA  6. ICD  7. Bradycardia       PLAN     Patient is doing well. Continue current regimen and follow-up in device clinic. FOLLOW-UP     One year      Thank you,  Melany Aguilar MD and Dr. Leela Gonsalez for involving me in the care of this extraordinarily pleasant male. Please do not hesitate to contact me for further questions/concerns.          Jarrell Rossi MD  Cardiac Electrophysiology / Cardiology    [place] [address].  [address], [address] [facility], [address] [place] [address]  [phone] / [phone] Fax   [phone] / [phone] Fax

## 2017-04-26 NOTE — MR AVS SNAPSHOT
Visit Information Date & Time Provider Department Dept. Phone Encounter #  
 4/26/2017 11:00 AM Chino Velazquez MD [facility]ieves [phone] [id number] Your Appointments 6/28/2017 10:40 AM  
ESTABLISHED PATIENT with Ramo De Dios MD  
[facility] (ANTHONY SCHEDULING) Appt Note: ANNUAL; ANNUAL  
 [address] [facility] [place] [address] [facility] [address]  
[phone]  
  
   
 [address] [facility] [address]  
  
    
 5/2/2018 11:15 AM  
PACEMAKER with PACEMAKER, STFRANCES  
[facility] (ANTHONY SCHEDULING) Appt Note: ford/icd/stf/lat  
 [address] [place] [address] [facility] [id number]  
[phone]  
  
   
 [address] [place] [address] [address] [place]  
  
    
  
 7/27/2017 10:45 AM  
REMOTE OFFICE VISIT with Arianne Jeffery [facility] (ANTHONY SCHEDULING) Appt Note: ford/icd/stf  
 [address] [place] [address] [facility] [address]  
[phone]  
  
   
 [address] [place] [address] [address] [place]  
  
    
 10/31/2017 11:15 AM  
REMOTE OFFICE VISIT with Arianne Jeffery [facility] (ANTHONY SCHEDULING) Appt Note: ford/icd/stf  
 [address] [place] [address] [address] [facility]  
[phone]  
  
    
 2/1/2018  8:45 AM  
REMOTE OFFICE VISIT with Arianne Jeffery [facility] (ANTHONY SCHEDULING) Appt Note: lat/icd/stf  
 [address] [place] [address] [facility]  
[phone] Upcoming Health Maintenance Date Due Hepatitis C Screening 1955 FOOT EXAM Q1 4/25/1963 EYE EXAM RETINAL OR DILATED Q1 4/25/1963 COLONOSCOPY 4/25/1971 DTaP/Tdap/Td series (1 - Tdap) 4/25/1974 ZOSTER VACCINE AGE 60> 4/25/2013 MICROALBUMIN Q1 6/2/2015 HEMOGLOBIN A1C Q6M 8/6/2017 LIPID PANEL Q1 10/4/2017 Allergies as of 4/26/2017  Review Complete On: 4/26/2017 By: Gómez Allen MD  
  
 Severity Noted Reaction Type Reactions Lipitor [Atorvastatin]  10/04/2016    Myalgia Current Immunizations  Reviewed on 10/4/2016 Name Date Influenza Vaccine 10/4/2016, 11/18/2015 Influenza Vaccine (Quad) PF 11/18/2015 11:33 AM  
 Pneumococcal Polysaccharide (PPSV-23) 3/30/2016 12:41 PM  
  
 Not reviewed this visit Vitals BP Pulse Resp Height(growth percentile) Weight(growth percentile) BMI  
 (!) 86/52 (BP 1 Location: Right arm, BP Patient Position: Sitting) 88 18 5' 11\" (1.803 m) 152 lb (68.9 kg) 21.2 kg/m2 Smoking Status Former Smoker Vitals History BMI and BSA Data Body Mass Index Body Surface Area  
 21.2 kg/m 2 1.86 m 2 Preferred Pharmacy Pharmacy Name Phone [facility] [address] [facility] [phone] Your Updated Medication List  
  
   
This list is accurate as of: 4/26/17 11:51 AM.  Always use your most recent med list.  
  
  
  
  
 albuterol 90 mcg/actuation inhaler Commonly known as:  PROVENTIL HFA, VENTOLIN HFA, PROAIR HFA Take 2 Puffs by inhalation every four (4) hours as needed for Wheezing. aspirin delayed-release 81 mg tablet Take 81 mg by mouth every evening. clotrimazole-betamethasone topical cream  
Commonly known as:  Oral Mage Apply  to affected area daily as needed for Skin Irritation (on feet). digoxin 0.25 mg tablet Commonly known as:  LANOXIN  
TAKE 1 TAB BY MOUTH DAILY. fluticasone 50 mcg/actuation nasal spray Commonly known as:  FLONASE  
1-2 Sprays by Both Nostrils route daily as needed for Rhinitis. glucose blood VI test strips strip Commonly known as:  CONTOUR NEXT STRIPS  
E11.9 daily  
  
 inhalational spacing device 1 Each by Does Not Apply route as needed. magnesium oxide 400 mg tablet Commonly known as:  MAG-OX TAKE 1 TAB BY MOUTH DAILY. metFORMIN  mg tablet Commonly known as:  GLUCOPHAGE XR Take 1 Tab by mouth daily (after breakfast). metoprolol succinate 25 mg XL tablet Commonly known as:  TOPROL-XL Take 1 Tab by mouth daily. warfarin 5 mg tablet Commonly known as:  COUMADIN  
take 1 tablet by mouth once daily except none on Sunday Introducing [facility] SERVICES! Vanessa Calix introduces [employer] patient portal. Now you can access parts of your medical record, email your doctor's office, and request medication refills online. 1. In your internet browser, go to [email or web address] 2. Click on the First Time User? Click Here link in the Sign In box. You will see the New Member Sign Up page. 3. Enter your [employer] Access Code exactly as it appears below. You will not need to use this code after youve completed the sign-up process. If you do not sign up before the expiration date, you must request a new code. · [employer] Access Code: [email or web address] Expires: 5/7/2017 12:37 PM 
 
4. Enter the last four digits of your Social Security Number (xxxx) and Date of Birth (mm/dd/yyyy) as indicated and click Submit. You will be taken to the next sign-up page. 5. Create a [employer] ID. This will be your [employer] login ID and cannot be changed, so think of one that is secure and easy to remember. 6. Create a [employer] password. You can change your password at any time. 7. Enter your Password Reset Question and Answer. This can be used at a later time if you forget your password. 8. Enter your e-mail address. You will receive e-mail notification when new information is available in [address] 9. Click Sign Up. You can now view and download portions of your medical record. 10. Click the Download Summary menu link to download a portable copy of your medical information.  
 
If you have questions, please visit the Frequently Asked Questions section of the [employer]. Remember, [employer] is NOT to be used for urgent needs. For medical emergencies, dial 911. Now available from your iPhone and Android! Please provide this summary of care documentation to your next provider. Your primary care clinician is listed as [address] [address] [place] [address] [address]. If you have any questions after today's visit, please call [phone].

## 2017-04-26 NOTE — PROGRESS NOTES
Visit Vitals    BP (!) 86/52 (BP 1 Location: Right arm, BP Patient Position: Sitting)    Pulse 88    Resp 18    Ht 5' 11\" (1.803 m)    Wt 152 lb (68.9 kg)    BMI 21.2 kg/m2

## 2017-05-08 DIAGNOSIS — I48.19 PERSISTENT ATRIAL FIBRILLATION (HCC): ICD-10-CM

## 2017-05-08 RX ORDER — DIGOXIN 250 MCG
TABLET ORAL
Qty: 90 TAB | Refills: 1 | Status: SHIPPED | OUTPATIENT
Start: 2017-05-08 | End: 2017-08-22

## 2017-05-08 RX ORDER — METOPROLOL SUCCINATE 25 MG/1
TABLET, EXTENDED RELEASE ORAL
Qty: 90 TAB | Refills: 1 | Status: SHIPPED | OUTPATIENT
Start: 2017-05-08 | End: 2017-08-22

## 2017-05-16 ENCOUNTER — TELEPHONE (OUTPATIENT)
Dept: INTERNAL MEDICINE CLINIC | Age: 62
End: 2017-05-16

## 2017-05-17 RX ORDER — WARFARIN SODIUM 5 MG/1
TABLET ORAL
Qty: 45 TAB | Refills: 6 | OUTPATIENT
Start: 2017-05-17

## 2017-05-17 RX ORDER — WARFARIN SODIUM 5 MG/1
TABLET ORAL
Qty: 90 TAB | Refills: 1 | Status: SHIPPED | OUTPATIENT
Start: 2017-05-17

## 2017-05-19 ENCOUNTER — DOCUMENTATION ONLY (OUTPATIENT)
Dept: INTERNAL MEDICINE CLINIC | Age: 62
End: 2017-05-19

## 2017-05-19 NOTE — PROGRESS NOTES
Prior auth obtained for Metoprolol Succinate 25 mg from 05/18/17 until 05/18/18. Notified the pharmacy via fax.

## 2017-06-06 ENCOUNTER — TELEPHONE (OUTPATIENT)
Dept: INTERNAL MEDICINE CLINIC | Age: 62
End: 2017-06-06

## 2017-06-26 ENCOUNTER — TELEPHONE (OUTPATIENT)
Dept: INTERNAL MEDICINE CLINIC | Age: 62
End: 2017-06-26

## 2017-06-26 NOTE — TELEPHONE ENCOUNTER
Spoke with sulema HIPSUSAN verified.  States that PT/INR id checked by home health and pat is followed by Jennifer Rossi of [facility]

## 2017-08-22 ENCOUNTER — OFFICE VISIT (OUTPATIENT)
Dept: CARDIOLOGY CLINIC | Age: 62
End: 2017-08-22

## 2017-08-22 VITALS
BODY MASS INDEX: 21.56 KG/M2 | OXYGEN SATURATION: 100 % | HEIGHT: 71 IN | WEIGHT: 154 LBS | RESPIRATION RATE: 18 BRPM | DIASTOLIC BLOOD PRESSURE: 64 MMHG | HEART RATE: 100 BPM | SYSTOLIC BLOOD PRESSURE: 90 MMHG

## 2017-08-22 DIAGNOSIS — I25.5 ISCHEMIC CARDIOMYOPATHY: ICD-10-CM

## 2017-08-22 DIAGNOSIS — E78.5 DYSLIPIDEMIA: ICD-10-CM

## 2017-08-22 DIAGNOSIS — Z78.9 STATIN INTOLERANCE: ICD-10-CM

## 2017-08-22 DIAGNOSIS — I25.10 CORONARY ARTERY DISEASE INVOLVING NATIVE CORONARY ARTERY OF NATIVE HEART WITHOUT ANGINA PECTORIS: Primary | ICD-10-CM

## 2017-08-22 DIAGNOSIS — I48.20 CHRONIC ATRIAL FIBRILLATION (HCC): ICD-10-CM

## 2017-08-22 DIAGNOSIS — Z86.73 HISTORY OF CVA (CEREBROVASCULAR ACCIDENT): ICD-10-CM

## 2017-08-22 DIAGNOSIS — E11.59 DM TYPE 2 CAUSING VASCULAR DISEASE (HCC): ICD-10-CM

## 2017-08-22 RX ORDER — TRAMADOL HYDROCHLORIDE 50 MG/1
TABLET ORAL
Refills: 0 | COMMUNITY
Start: 2017-08-08

## 2017-08-22 NOTE — PROGRESS NOTES
Visit Vitals    BP 90/64 (BP 1 Location: Left arm, BP Patient Position: Sitting)    Pulse 100    Resp 18    Ht 5' 11\" (1.803 m)    Wt 154 lb (69.9 kg)    SpO2 100%    BMI 21.48 kg/m2     Medication list updated per verbal order of Dr. Iman Nina.

## 2017-08-22 NOTE — PATIENT INSTRUCTIONS
[employer] Activation    Thank you for requesting access to [employer]. Please follow the instructions below to securely access and download your online medical record. [employer] allows you to send messages to your doctor, view your test results, renew your prescriptions, schedule appointments, and more. How Do I Sign Up? 1. In your internet browser, go to [email or web address]  2. Click on the First Time User? Click Here link in the Sign In box. You will be redirect to the New Member Sign Up page. 3. Enter your [employer] Access Code exactly as it appears below. You will not need to use this code after youve completed the sign-up process. If you do not sign up before the expiration date, you must request a new code. [employer] Access Code: [email or web address]  Expires: 2017  2:49 PM (This is the date your [employer] access code will )    4. Enter the last four digits of your Social Security Number (xxxx) and Date of Birth (mm/dd/yyyy) as indicated and click Submit. You will be taken to the next sign-up page. 5. Create a [employer] ID. This will be your [employer] login ID and cannot be changed, so think of one that is secure and easy to remember. 6. Create a [employer] password. You can change your password at any time. 7. Enter your Password Reset Question and Answer. This can be used at a later time if you forget your password. 8. Enter your e-mail address. You will receive e-mail notification when new information is available in [address] 9. Click Sign Up. You can now view and download portions of your medical record. 10. Click the Download Summary menu link to download a portable copy of your medical information. Additional Information    If you have questions, please visit the Frequently Asked Questions section of the [employer] website at [email or web address] Remember, [employer] is NOT to be used for urgent needs. For medical emergencies, dial 911.            Atrial Fibrillation: Care Instructions  Your Care Instructions    Atrial fibrillation is an irregular and often fast heartbeat. Treating this condition is important for several reasons. It can cause blood clots, which can travel from your heart to your brain and cause a stroke. If you have a fast heartbeat, you may feel lightheaded, dizzy, and weak. An irregular heartbeat can also increase your risk for heart failure. Atrial fibrillation is often the result of another heart condition, such as high blood pressure or coronary artery disease. Making changes to improve your heart condition will help you stay healthy and active. Follow-up care is a key part of your treatment and safety. Be sure to make and go to all appointments, and call your doctor if you are having problems. It's also a good idea to know your test results and keep a list of the medicines you take. How can you care for yourself at home? Medicines  · Take your medicines exactly as prescribed. Call your doctor if you think you are having a problem with your medicine. You will get more details on the specific medicines your doctor prescribes. · If your doctor has given you a blood thinner to prevent a stroke, be sure you get instructions about how to take your medicine safely. Blood thinners can cause serious bleeding problems. · Do not take any vitamins, over-the-counter drugs, or herbal products without talking to your doctor first.  Lifestyle changes  · Do not smoke. Smoking can increase your chance of a stroke and heart attack. If you need help quitting, talk to your doctor about stop-smoking programs and medicines. These can increase your chances of quitting for good. · Eat a heart-healthy diet. · Stay at a healthy weight. Lose weight if you need to. · Limit alcohol to 2 drinks a day for men and 1 drink a day for women. Too much alcohol can cause health problems. · Avoid colds and flu. Get a pneumococcal vaccine shot.  If you have had one before, ask your doctor whether you need another dose. Get a flu shot every year. If you must be around people with colds or flu, wash your hands often. Activity  · If your doctor recommends it, get more exercise. Walking is a good choice. Bit by bit, increase the amount you walk every day. Try for at least 30 minutes on most days of the week. You also may want to swim, bike, or do other activities. Your doctor may suggest that you join a cardiac rehabilitation program so that you can have help increasing your physical activity safely. · Start light exercise if your doctor says it is okay. Even a small amount will help you get stronger, have more energy, and manage stress. Walking is an easy way to get exercise. Start out by walking a little more than you did in the hospital. Gradually increase the amount you walk. · When you exercise, watch for signs that your heart is working too hard. You are pushing too hard if you cannot talk while you are exercising. If you become short of breath or dizzy or have chest pain, sit down and rest immediately. · Check your pulse regularly. Place two fingers on the artery at the palm side of your wrist, in line with your thumb. If your heartbeat seems uneven or fast, talk to your doctor. When should you call for help? Call 911 anytime you think you may need emergency care. For example, call if:  · You have symptoms of a heart attack. These may include:  ¨ Chest pain or pressure, or a strange feeling in the chest.  ¨ Sweating. ¨ Shortness of breath. ¨ Nausea or vomiting. ¨ Pain, pressure, or a strange feeling in the back, neck, jaw, or upper belly or in one or both shoulders or arms. ¨ Lightheadedness or sudden weakness. ¨ A fast or irregular heartbeat. After you call 911, the  may tell you to chew 1 adult-strength or 2 to 4 low-dose aspirin. Wait for an ambulance. Do not try to drive yourself. · You have symptoms of a stroke.  These may include:  ¨ Sudden numbness, tingling, weakness, or loss of movement in your face, arm, or leg, especially on only one side of your body. ¨ Sudden vision changes. ¨ Sudden trouble speaking. ¨ Sudden confusion or trouble understanding simple statements. ¨ Sudden problems with walking or balance. ¨ A sudden, severe headache that is different from past headaches. · You passed out (lost consciousness). Call your doctor now or seek immediate medical care if:  · You have new or increased shortness of breath. · You feel dizzy or lightheaded, or you feel like you may faint. · Your heart rate becomes irregular. · You can feel your heart flutter in your chest or skip heartbeats. Tell your doctor if these symptoms are new or worse. Watch closely for changes in your health, and be sure to contact your doctor if you have any problems. Where can you learn more? Go to http://brigida-thomas.info/. Enter U020 in the search box to learn more about \"Atrial Fibrillation: Care Instructions. \"  Current as of: September 21, 2016  Content Version: 11.3  © 9506-6026 [employer]. Care instructions adapted under license by [employer] (which disclaims liability or warranty for this information). If you have questions about a medical condition or this instruction, always ask your healthcare professional. [address] any warranty or liability for your use of this information.

## 2017-08-22 NOTE — PROGRESS NOTES
Subjective:     Problem List  Date Reviewed: 8/22/2017          Codes Class Noted    Statin intolerance ICD-10-CM: Z78.9  ICD-9-CM: 995.27  10/4/2016        DM type 2 causing vascular disease ([facility] [address] ICD-10-CM: E11.59  ICD-9-CM: 250.70, 443.81  5/4/2016        Debilitated patient ICD-10-CM: R53.81  ICD-9-CM: 799.3  5/4/2016        History of CVA (cerebrovascular accident) ICD-10-CM: Z86.73  ICD-9-CM: V12.54  3/28/2016        Dyslipidemia ICD-10-CM: E78.5  ICD-9-CM: 272.4  2/22/2016    Overview Signed 6/20/2016  1:21 PM by Felicie Romberg, MD A. FLP (3/24/16): Tot 207, , HDL 32,  (no Rx). Depression ICD-10-CM: F32.9  ICD-9-CM: 761  11/18/2015        CAD (coronary artery disease) ICD-10-CM: I25.10  ICD-9-CM: 414.00  Unknown        Ischemic cardiomyopathy ICD-10-CM: I25.5  ICD-9-CM: 414.8  Unknown    Overview Addendum 8/22/2017  2:33 PM by Felicie Romberg, MD A.  2014 LVEF=40%. B.  Cath (3/23/16):  LAD p100. LCx m90; OM1 50. RCA p80, d80. EF 10% with severe GHK and apical AK. No AVG/MR. Patent L SC.  C.  Echo (3/24/16):  EF 20%. Mild to mod dil LA. Dil RA. Mild TR/OR. D.  S/P PPM/ICD (3/25/16, Ramonita): [employer].  E.  Echo (9/12/16): EF 15%. Sev dil LA. Dil RA. Mild MR. Mild to mod TR. PASP 32. Atrial fibrillation [facility]) ICD-10-CM: I48.91  ICD-9-CM: 427.31  Unknown              Mr. Samia Echavarria is a 57 y.o. man with the above past medical history, who presents for follow up of his ischemic cardiomyopathy. He is doing fairly well all things considered. He denies any chest pain or chest discomfort, or dyspnea. Otherwise he is doing well. His defibrillator is not firing. He is tolerating his Coumadin well.          History   Smoking Status    Former Smoker    Packs/day: 0.25    Years: 3.00    Quit date: 1/1/1972   Smokeless Tobacco    Never Used       Current Outpatient Prescriptions   Medication Sig Dispense Refill    traMADol (ULTRAM) 50 mg tablet take 1 tablet by mouth every 6 hours if needed for pain  0    warfarin (COUMADIN) 5 mg tablet take 1 tablet by mouth once daily except none on Sunday 90 Tab 1    albuterol (PROVENTIL HFA, VENTOLIN HFA, PROAIR HFA) 90 mcg/actuation inhaler Take 2 Puffs by inhalation every four (4) hours as needed for Wheezing. 1 Inhaler 0    inhalational spacing device 1 Each by Does Not Apply route as needed. 1 Each 0    magnesium oxide (MAG-OX) 400 mg tablet TAKE 1 TAB BY MOUTH DAILY. 30 Tab 6    glucose blood VI test strips (CONTOUR NEXT STRIPS) strip E11.9 daily 50 Strip 11    aspirin delayed-release 81 mg tablet Take 81 mg by mouth every evening.  fluticasone (FLONASE) 50 mcg/actuation nasal spray 1-2 Sprays by Both Nostrils route daily as needed for Rhinitis.  clotrimazole-betamethasone (LOTRISONE) topical cream Apply  to affected area daily as needed for Skin Irritation (on feet).  digoxin (LANOXIN) 0.25 mg tablet TAKE 1 TAB BY MOUTH DAILY. 90 Tab 1    metoprolol succinate (TOPROL-XL) 25 mg XL tablet TAKE 1 TAB BY MOUTH DAILY. 90 Tab 1    metFORMIN ER (GLUCOPHAGE XR) 500 mg tablet Take 1 Tab by mouth daily (after breakfast). 90 Tab 1       Objective:     Visit Vitals    BP 90/64 (BP 1 Location: Left arm, BP Patient Position: Sitting)    Pulse 100    Resp 18    Ht 5' 11\" (1.803 m)    Wt 154 lb (69.9 kg)    SpO2 100%    BMI 21.48 kg/m2       HEENT Exam:     Normocephalic, atraumatic. EOMI. Oropharynx negative. Neck supple. No lymphadenopathy. Lung Exam:     The patient is not dyspneic. There is no cough. The lungs are clear to percussion. Breath sounds are heard equally in all lung fields. There are no wheezes, rales, rhonchi, or rubs heard on auscultation. Heart Exam:     The rhythm is irregularly irregular. The PMI is in the 5th intercostal space of the MCL. Apical impulse is normal. S1 is regular. S2 is physiologic. There is no S3, S4 gallop, murmur, click, or rub.           Abdomen Exam:     Bowel sounds are normoactive. Abdomen benign. Extremities Exam:     The extremities are atraumatic appearing. There is no clubbing, cyanosis, edema, ulcers, varicose veins, rash, swelling, erythemia noted in the extremities. The neurovascular status is grossly intact with normal distal sensation and pulses. Vascular Exam:     The radial, brachial, dorsalis pedis, posterior tibial, are equal and strong bilaterally The carotids are equal bilaterally without bruits. EKG: [facility] Assessment/Plan:     Mr. Deana Beavers appears stable from a cardiac standpoint. We are going to stay the course with his current medication regimen, and he is going to follow up with me in one year's time. Plan:  1. Continue outpatient medication regimen. 2. Follow up with me in one year's time. 3. Diet and exercise as tolerated. 4. Call my office, call his primary care physician, or return to the hospital should any concerning symptomatology arise. Mr. Deana Beavers and his family indicated that they understood this plan and wished to proceed ahead.             Patient Care Team:  Chaparro Kapoor MD as PCP - General (General Practice)  Patricia Ramon NP (Neurology)  Kamar Little MD as Referring Provider (Cardiology)  Osiris Reyes MD (Cardiology)

## 2017-08-22 NOTE — MR AVS SNAPSHOT
Visit Information Date & Time Provider Department Dept. Phone Encounter #  
 8/22/2017  2:20 PM Dash Gorman MD [facility] [place] [phone] [id number] Your Appointments 5/2/2018 11:00 AM  
ESTABLISHED PATIENT with Myron Collins MD  
[facility] ([address]) Appt Note: annual  
 [address] [facility] [address] [facility] [address]  
[phone]  
  
   
 [address] [place] [address]  
  
    
 5/2/2018 11:15 AM  
PACEMAKER with PACEMAKER STFRANCES  
[facility] ([facility] SCHEDULING) Appt Note: ford/icd/stf/lat  
 [address] [facility] [address] [facility] [address]  
[phone]  
  
   
 [address] [facility] [address] [facility] [address]  
  
    
  
 10/31/2017 11:15 AM  
REMOTE OFFICE VISIT with Joanna Aguirre [facility] ([facility] SCHEDULING) Appt Note: ford/icd/stf  
 [address] [facility] [address] [facility] [address]  
[phone]  
  
   
 [address] [facility] [address] [facility] [address]  
  
    
 2/1/2018  8:45 AM  
REMOTE OFFICE VISIT with Joanna Aguirre [facility] ([place] SCHEDULING) Appt Note: lat/icd/stf  
 [address] [facility] [address] [facility]  
[phone] Upcoming Health Maintenance Date Due Hepatitis C Screening 1955 FOOT EXAM Q1 4/25/1963 EYE EXAM RETINAL OR DILATED Q1 4/25/1963 COLONOSCOPY 4/25/1971 DTaP/Tdap/Td series (1 - Tdap) 4/25/1974 ZOSTER VACCINE AGE 60> 2/25/2013 MICROALBUMIN Q1 6/2/2015 INFLUENZA AGE 9 TO ADULT 8/1/2017 HEMOGLOBIN A1C Q6M 8/6/2017 LIPID PANEL Q1 10/4/2017 Allergies as of 8/22/2017  Review Complete On: 8/22/2017 By: Dash Gorman MD  
  
 Severity Noted Reaction Type Reactions Lipitor [Atorvastatin]  10/04/2016    Myalgia Current Immunizations  Reviewed on 10/4/2016 Name Date Influenza Vaccine 10/4/2016, 11/18/2015 Influenza Vaccine (Quad) PF 11/18/2015 11:33 AM  
 Pneumococcal Polysaccharide (PPSV-23) 3/30/2016 12:41 PM  
  
 Not reviewed this visit You Were Diagnosed With   
  
 Codes Comments Ischemic cardiomyopathy     ICD-10-CM: I25.5 ICD-9-CM: 414.8 Vitals BP Pulse Resp Height(growth percentile) Weight(growth percentile) SpO2  
 90/64 (BP 1 Location: Left arm, BP Patient Position: Sitting) 100 18 5' 11\" (1.803 m) 154 lb (69.9 kg) 100% BMI Smoking Status 21.48 kg/m2 Former Smoker Vitals History BMI and BSA Data Body Mass Index Body Surface Area  
 21.48 kg/m 2 1.87 m 2 Preferred Pharmacy [facility]acy Name [facility]  [phone] Your Updated Medication List  
  
   
This list is accurate as of: 8/22/17  2:49 PM.  Always use your most recent med list.  
  
  
  
  
 albuterol 90 mcg/actuation inhaler Commonly known as:  PROVENTIL HFA, VENTOLIN HFA, PROAIR HFA Take 2 Puffs by inhalation every four (4) hours as needed for Wheezing. aspirin delayed-release 81 mg tablet Take 81 mg by mouth every evening. clotrimazole-betamethasone topical cream  
Commonly known as:  Declan Sand Apply  to affected area daily as needed for Skin Irritation (on feet). digoxin 0.25 mg tablet Commonly known as:  LANOXIN  
TAKE 1 TAB BY MOUTH DAILY. fluticasone 50 mcg/actuation nasal spray Commonly known as:  FLONASE  
1-2 Sprays by Both Nostrils route daily as needed for Rhinitis. glucose blood VI test strips strip Commonly known as:  CONTOUR NEXT STRIPS  
E11.9 daily  
  
 inhalational spacing device 1 Each by Does Not Apply route as needed. magnesium oxide 400 mg tablet Commonly known as:  MAG-OX  
TAKE 1 TAB BY MOUTH DAILY. metFORMIN  mg tablet Commonly known as:  GLUCOPHAGE XR Take 1 Tab by mouth daily (after breakfast). metoprolol succinate 25 mg XL tablet Commonly known as:  TOPROL-XL  
TAKE 1 TAB BY MOUTH DAILY. traMADol 50 mg tablet Commonly known as:  ULTRAM  
take 1 tablet by mouth every 6 hours if needed for pain  
  
 warfarin 5 mg tablet Commonly known as:  COUMADIN  
take 1 tablet by mouth once daily except none on Sunday Introducing [facility]! [employer] introduces [employer] patient portal. Now you can access parts of your medical record, email your doctor's office, and request medication refills online. 1. In your internet browser, go to [email or web address]. 2. Click on the First Time User? Click Here link in the Sign In box. You will see the New Member Sign Up page. 3. Enter your [employer] Access Code exactly as it appears below. You will not need to use this code after youve completed the sign-up process. If you do not sign up before the expiration date, you must request a new code. · [employer] Access Code: [email or web address] Expires: 11/20/2017  2:49 PM 
 
4. Enter the last four digits of your Social Security Number (xxxx) and Date of Birth (mm/dd/yyyy) as indicated and click Submit. You will be taken to the next sign-up page. 5. Create a [employer]ura ID. This will be your [employer] login ID and cannot be changed, so think of one that is secure and easy to remember. 6. Create a [employer] password. You can change your password at any time. 7. Enter your Password Reset Question and Answer. This can be used at a later time if you forget your password. 8. Enter your e-mail address. You will receive e-mail notification when new information is available in [address] 9. Click Sign Up. You can now view and download portions of your medical record. 10. Click the Download Summary menu link to download a portable copy of your medical information. If you have questions, please visit the Frequently Asked Questions section of the [employer] website. Remember, [employer] is NOT to be used for urgent needs. For medical emergencies, dial 911. Now available from your iPhone and Android! Please provide this summary of care documentation to your next provider. Your primary care clinician is listed as Rich Garcia. If you have any questions after today's visit, please call [phone].

## 2017-11-08 DIAGNOSIS — I25.5 ISCHEMIC CARDIOMYOPATHY: ICD-10-CM

## 2017-11-08 DIAGNOSIS — I25.10 CORONARY ARTERY DISEASE INVOLVING NATIVE CORONARY ARTERY OF NATIVE HEART WITHOUT ANGINA PECTORIS: ICD-10-CM

## 2017-11-08 DIAGNOSIS — I48.19 PERSISTENT ATRIAL FIBRILLATION (HCC): ICD-10-CM

## 2017-11-08 RX ORDER — LANOLIN ALCOHOL/MO/W.PET/CERES
CREAM (GRAM) TOPICAL
Qty: 90 TAB | Refills: 1 | Status: SHIPPED | OUTPATIENT
Start: 2017-11-08 | End: 2018-05-02

## 2017-11-08 NOTE — TELEPHONE ENCOUNTER
Requested Prescriptions     Signed Prescriptions Disp Refills    magnesium oxide (MAG-OX) 400 mg tablet 90 Tab 1     Sig: take 1 tablet by mouth once daily     Authorizing Provider: Jigar Bae     Ordering User: Kirsty Owens

## 2018-05-02 ENCOUNTER — CLINICAL SUPPORT (OUTPATIENT)
Dept: CARDIOLOGY CLINIC | Age: 63
End: 2018-05-02

## 2018-05-02 ENCOUNTER — OFFICE VISIT (OUTPATIENT)
Dept: CARDIOLOGY CLINIC | Age: 63
End: 2018-05-02

## 2018-05-02 VITALS
RESPIRATION RATE: 20 BRPM | DIASTOLIC BLOOD PRESSURE: 52 MMHG | WEIGHT: 156 LBS | HEART RATE: 80 BPM | BODY MASS INDEX: 21.84 KG/M2 | HEIGHT: 71 IN | SYSTOLIC BLOOD PRESSURE: 100 MMHG | OXYGEN SATURATION: 99 %

## 2018-05-02 DIAGNOSIS — Z95.810 ICD (IMPLANTABLE CARDIOVERTER-DEFIBRILLATOR) IN PLACE: Primary | ICD-10-CM

## 2018-05-02 DIAGNOSIS — I25.5 ISCHEMIC CARDIOMYOPATHY: Primary | ICD-10-CM

## 2018-05-02 DIAGNOSIS — I48.91 ATRIAL FIBRILLATION, UNSPECIFIED TYPE (HCC): ICD-10-CM

## 2018-05-02 DIAGNOSIS — Z95.810 ICD (IMPLANTABLE CARDIOVERTER-DEFIBRILLATOR) IN PLACE: ICD-10-CM

## 2018-05-02 RX ORDER — LANOLIN ALCOHOL/MO/W.PET/CERES
500 CREAM (GRAM) TOPICAL DAILY
COMMUNITY

## 2018-05-02 RX ORDER — CHOLECALCIFEROL TAB 125 MCG (5000 UNIT) 125 MCG
TAB ORAL DAILY
COMMUNITY

## 2018-05-02 NOTE — PROGRESS NOTES
Visit Vitals    /52 (BP 1 Location: Left arm, BP Patient Position: Sitting)    Pulse 80    Resp 20    Ht 5' 11\" (1.803 m)    Wt 156 lb (70.8 kg)    SpO2 99%    BMI 21.76 kg/m2     Medication changes made  per verbal order of Dr. Gomez Saldana

## 2018-05-02 NOTE — PATIENT INSTRUCTIONS
Treatment Plan:  · Have labs drawn at any [facility] location or have results faxed to [phone]. Attention: Dr. Antoine Clark. · Follow up in 6 months. · Contact our office with any concerns. Atrial Fibrillation: Care Instructions  Your Care Instructions    Atrial fibrillation is an irregular and often fast heartbeat. Treating this condition is important for several reasons. It can cause blood clots, which can travel from your heart to your brain and cause a stroke. If you have a fast heartbeat, you may feel lightheaded, dizzy, and weak. An irregular heartbeat can also increase your risk for heart failure. Atrial fibrillation is often the result of another heart condition, such as high blood pressure or coronary artery disease. Making changes to improve your heart condition will help you stay healthy and active. Follow-up care is a key part of your treatment and safety. Be sure to make and go to all appointments, and call your doctor if you are having problems. It's also a good idea to know your test results and keep a list of the medicines you take. How can you care for yourself at home? Medicines  ? · Take your medicines exactly as prescribed. Call your doctor if you think you are having a problem with your medicine. You will get more details on the specific medicines your doctor prescribes. ? · If your doctor has given you a blood thinner to prevent a stroke, be sure you get instructions about how to take your medicine safely. Blood thinners can cause serious bleeding problems. ? · Do not take any vitamins, over-the-counter drugs, or herbal products without talking to your doctor first.   ? Lifestyle changes  ? · Do not smoke. Smoking can increase your chance of a stroke and heart attack. If you need help quitting, talk to your doctor about stop-smoking programs and medicines. These can increase your chances of quitting for good. ? · Eat a heart-healthy diet. ? · Stay at a healthy weight.  Lose weight if you need to.   ? · Limit alcohol to 2 drinks a day for men and 1 drink a day for women. Too much alcohol can cause health problems. ? · Avoid colds and flu. Get a pneumococcal vaccine shot. If you have had one before, ask your doctor whether you need another dose. Get a flu shot every year. If you must be around people with colds or flu, wash your hands often. Activity  ? · If your doctor recommends it, get more exercise. Walking is a good choice. Bit by bit, increase the amount you walk every day. Try for at least 30 minutes on most days of the week. You also may want to swim, bike, or do other activities. Your doctor may suggest that you join a cardiac rehabilitation program so that you can have help increasing your physical activity safely. ? · Start light exercise if your doctor says it is okay. Even a small amount will help you get stronger, have more energy, and manage stress. Walking is an easy way to get exercise. Start out by walking a little more than you did in the hospital. Gradually increase the amount you walk. ? · When you exercise, watch for signs that your heart is working too hard. You are pushing too hard if you cannot talk while you are exercising. If you become short of breath or dizzy or have chest pain, sit down and rest immediately. ? · Check your pulse regularly. Place two fingers on the artery at the palm side of your wrist, in line with your thumb. If your heartbeat seems uneven or fast, talk to your doctor. When should you call for help? Call 911 anytime you think you may need emergency care. For example, call if:  ? · You have symptoms of a heart attack. These may include:  ¨ Chest pain or pressure, or a strange feeling in the chest.  ¨ Sweating. ¨ Shortness of breath. ¨ Nausea or vomiting. ¨ Pain, pressure, or a strange feeling in the back, neck, jaw, or upper belly or in one or both shoulders or arms. ¨ Lightheadedness or sudden weakness.   ¨ A fast or irregular heartbeat. After you call 911, the  may tell you to chew 1 adult-strength or 2 to 4 low-dose aspirin. Wait for an ambulance. Do not try to drive yourself. ? · You have symptoms of a stroke. These may include:  ¨ Sudden numbness, tingling, weakness, or loss of movement in your face, arm, or leg, especially on only one side of your body. ¨ Sudden vision changes. ¨ Sudden trouble speaking. ¨ Sudden confusion or trouble understanding simple statements. ¨ Sudden problems with walking or balance. ¨ A sudden, severe headache that is different from past headaches. ? · You passed out (lost consciousness). ?Call your doctor now or seek immediate medical care if:  ? · You have new or increased shortness of breath. ? · You feel dizzy or lightheaded, or you feel like you may faint. ? · Your heart rate becomes irregular. ? · You can feel your heart flutter in your chest or skip heartbeats. Tell your doctor if these symptoms are new or worse. ? Watch closely for changes in your health, and be sure to contact your doctor if you have any problems. Where can you learn more? Go to http://brigida-thomas.info/. Enter U020 in the search box to learn more about \"Atrial Fibrillation: Care Instructions. \"  Current as of: September 21, 2016  Content Version: 11.4  © 9996-8506 Health[employer], [employer]. Care instructions adapted under license by [employer] (which disclaims liability or warranty for this information). If you have questions about a medical condition or this instruction, always ask your healthcare professional. [place] [address] any warranty or liability for your use of this information.

## 2018-05-02 NOTE — PROGRESS NOTES
HISTORY OF PRESENTING ILLNESS      Leigh Oppenheim is a 70 y.o. male with who was admitted for NSTEMI and asystole requiring ACLS which led to [facility] which identified severe multivessel CAD and and LVEF of 10%. He was evaluated by CT surgical teams at [facility] and [facility] and was considered a poor candidate for revascularization. He was also felt to be a poor candidate for PCI per Dr. Shiela Byrd. He is unable to tolerate beta blockers and ACEi due to borderline hypotension. He has a history of AF with CVA and has been considered a poor candidate for anticoagulation. He has residual aphasia however is able to communicate. He had bradycardia and thus underwent ICD implantation. ICD interrogation revealed an episode of VT at 279bpm on 3/30/16 which was defibrillated successfully at [address] after which we started magnesium supplement. He failed to demonstrate further episodes of VT. He presents today for follow up of these issues. Patient has been doing well.   Device interrogation reveals normal device functioning.          ACTIVE PROBLEM LIST     Patient Active Problem List    Diagnosis Date Noted    Statin intolerance 10/04/2016    DM type 2 causing vascular disease ([address] 05/04/2016    Debilitated patient 05/04/2016    History of CVA (cerebrovascular accident) 03/28/2016    Dyslipidemia 02/22/2016    Depression 11/18/2015    CAD (coronary artery disease)     Ischemic cardiomyopathy     Atrial fibrillation (HCC)            PAST MEDICAL HISTORY     Past Medical History:   Diagnosis Date    AF (atrial fibrillation) (HCC)     CAD (coronary artery disease)     Cardiomyopathy ([address]     Diabetes ([address]     type 2    Hypercholesterolemia     Memory loss     Swallowing difficulty            PAST SURGICAL HISTORY     Past Surgical History:   Procedure Laterality Date    CARDIAC SURG PROCEDURE UNLIST  2006    ablation    HX HEART CATHETERIZATION  2005    diffuse 40-50%    HX PACEMAKER      HX PACEMAKER PLACEMENT Left March 2016    HX UROLOGICAL      circumcision    HX WISDOM TEETH EXTRACTION  1980s    3 removed          ALLERGIES     Allergies   Allergen Reactions    Lipitor [Atorvastatin] Myalgia          FAMILY HISTORY     Family History   Problem Relation Age of Onset    Diabetes Father     Neuropathy Father     Heart Disease Father     Cancer Father     Stroke Father     Hypertension Sister     Stroke Mother     negative for cardiac disease       SOCIAL HISTORY     Social History     Social History    Marital status: SINGLE     Spouse name: N/A    Number of children: N/A    Years of education: N/A     Social History Main Topics    Smoking status: Former Smoker     Packs/day: 0.25     Years: 3.00     Quit date: 1/1/1972    Smokeless tobacco: Never Used    Alcohol use No    Drug use: No    Sexual activity: Yes     Other Topics Concern    Not on file     Social History Narrative         MEDICATIONS     Current Outpatient Prescriptions   Medication Sig    magnesium oxide (MAG-OX) 400 mg tablet take 1 tablet by mouth once daily    traMADol (ULTRAM) 50 mg tablet take 1 tablet by mouth every 6 hours if needed for pain    warfarin (COUMADIN) 5 mg tablet take 1 tablet by mouth once daily except none on Sunday    albuterol (PROVENTIL HFA, VENTOLIN HFA, PROAIR HFA) 90 mcg/actuation inhaler Take 2 Puffs by inhalation every four (4) hours as needed for Wheezing.  inhalational spacing device 1 Each by Does Not Apply route as needed.  glucose blood VI test strips (CONTOUR NEXT STRIPS) strip E11.9 daily    aspirin delayed-release 81 mg tablet Take 81 mg by mouth every evening.  fluticasone (FLONASE) 50 mcg/actuation nasal spray 1-2 Sprays by Both Nostrils route daily as needed for Rhinitis.  clotrimazole-betamethasone (LOTRISONE) topical cream Apply  to affected area daily as needed for Skin Irritation (on feet). No current facility-administered medications for this visit.         I have reviewed the nurses notes, vitals, problem list, allergy list, medical history, family, social history and medications. REVIEW OF SYMPTOMS      General: Pt denies excessive weight gain or loss. Pt is able to conduct ADL's  HEENT: Denies blurred vision, headaches, hearing loss, epistaxis and difficulty swallowing. Respiratory: Denies cough, congestion, shortness of breath, STEELE, wheezing or stridor. Cardiovascular: Denies precordial pain, palpitations, edema or PND  Gastrointestinal: Denies poor appetite, indigestion, abdominal pain or blood in stool  Genitourinary: Denies hematuria, dysuria, increased urinary frequency  Musculoskeletal: Denies joint pain or swelling from muscles or joints  Neurologic: Denies tremor, paresthesias, headache, or sensory motor disturbance  Psychiatric: Denies confusion, insomnia, depression  Integumentray: Denies rash, itching or ulcers. Hematologic: Denies easy bruising, bleeding       PHYSICAL EXAMINATION      There were no vitals filed for this visit. General: Well developed, in no acute distress. HEENT: No jaundice, oral mucosa moist, no oral ulcers  Neck: Supple, no stiffness, no lymphadenopathy, supple  Heart:  irreg irreg, no murmur, gallop or rub, no jugular venous distention  Respiratory: Clear bilaterally x 4, no wheezing or rales  Abdomen:   Soft, non-tender, bowel sounds are active.   Extremities:  No edema, normal cap refill, no cyanosis. Musculoskeletal: No clubbing, no deformities  Neuro: A&Ox3, speech clear, gait stable, cooperative, no focal neurologic deficits  Skin: Skin color is normal. No rashes or lesions.  Non diaphoretic, moist.  Vascular: 2+ pulses symmetric in all extremities       DIAGNOSTIC DATA      EKG: AF       LABORATORY DATA      Lab Results   Component Value Date/Time    WBC 4.5 04/02/2017 02:51 PM    HGB 13.3 04/02/2017 02:51 PM    HCT 41.0 04/02/2017 02:51 PM    PLATELET 367 (L) 25/96/8547 02:51 PM    MCV 90.9 04/02/2017 02:51 PM      Lab Results Component Value Date/Time    Sodium 143 04/02/2017 02:51 PM    Potassium 3.9 04/02/2017 02:51 PM    Chloride 106 04/02/2017 02:51 PM    CO2 32 04/02/2017 02:51 PM    Anion gap 5 04/02/2017 02:51 PM    Glucose 106 (H) 04/02/2017 02:51 PM    BUN 11 04/02/2017 02:51 PM    Creatinine 0.78 04/02/2017 02:51 PM    BUN/Creatinine ratio 14 04/02/2017 02:51 PM    GFR est AA >60 04/02/2017 02:51 PM    GFR est non-AA >60 04/02/2017 02:51 PM    Calcium 8.6 04/02/2017 02:51 PM    Bilirubin, total 0.6 04/02/2017 02:51 PM    AST (SGOT) 23 04/02/2017 02:51 PM    Alk. phosphatase 52 04/02/2017 02:51 PM    Protein, total 7.6 04/02/2017 02:51 PM    Albumin 3.3 (L) 04/02/2017 02:51 PM    Globulin 4.3 (H) 04/02/2017 02:51 PM    A-G Ratio 0.8 (L) 04/02/2017 02:51 PM    ALT (SGPT) 21 04/02/2017 02:51 PM           ASSESSMENT      1. Ventricular tachycardia  2. Coronary artery disease, native                                      A.  Poor candidate for revascularization  3. Cardiomyopathy                         A. LVEF 10%  4. Atrial fibrillation  5. CVA  6. ICD  7. Bradycardia       PLAN     Plan for basic metabolic panel and magnesium to determine whether additional supplementation is warranted. Otherwise continue follow-up in device clinic and continue current drug regimen. FOLLOW-UP     6 months      Thank you, Lucille Soliman MD for allowing me to participate in the care of this extraordinarily pleasant male. Please do not hesitate to contact me for further questions/concerns.          Sigifredo Martel MD  Cardiac Electrophysiology / Cardiology    [facility].  [address] [facility], [facility], [address] [facility]    [place], [address]  [phone] / [phone] Fax   [phone] / [phone] Fax

## 2018-10-22 ENCOUNTER — OFFICE VISIT (OUTPATIENT)
Dept: CARDIOLOGY CLINIC | Age: 63
End: 2018-10-22

## 2018-10-22 VITALS
HEART RATE: 80 BPM | RESPIRATION RATE: 16 BRPM | WEIGHT: 142.4 LBS | HEIGHT: 71 IN | DIASTOLIC BLOOD PRESSURE: 74 MMHG | BODY MASS INDEX: 19.94 KG/M2 | OXYGEN SATURATION: 100 % | SYSTOLIC BLOOD PRESSURE: 108 MMHG

## 2018-10-22 DIAGNOSIS — I25.10 CORONARY ARTERY DISEASE INVOLVING NATIVE CORONARY ARTERY OF NATIVE HEART WITHOUT ANGINA PECTORIS: ICD-10-CM

## 2018-10-22 DIAGNOSIS — I48.91 ATRIAL FIBRILLATION, UNSPECIFIED TYPE (HCC): ICD-10-CM

## 2018-10-22 DIAGNOSIS — E78.5 DYSLIPIDEMIA: ICD-10-CM

## 2018-10-22 DIAGNOSIS — Z95.810 ICD (IMPLANTABLE CARDIOVERTER-DEFIBRILLATOR) IN PLACE: ICD-10-CM

## 2018-10-22 DIAGNOSIS — I48.19 PERSISTENT ATRIAL FIBRILLATION (HCC): ICD-10-CM

## 2018-10-22 DIAGNOSIS — I25.5 ISCHEMIC CARDIOMYOPATHY: Primary | ICD-10-CM

## 2018-10-22 RX ORDER — LANOLIN ALCOHOL/MO/W.PET/CERES
CREAM (GRAM) TOPICAL
COMMUNITY

## 2018-10-22 RX ORDER — CETIRIZINE HCL 10 MG
TABLET ORAL
COMMUNITY

## 2018-10-22 RX ORDER — LANOLIN ALCOHOL/MO/W.PET/CERES
400 CREAM (GRAM) TOPICAL DAILY
COMMUNITY

## 2018-10-22 NOTE — PROGRESS NOTES
Office Follow-up    NAME: Geni Beltran   :  1955  MRM:  [id number]    Date:  10/22/2018            Assessment:     Problem List  Date Reviewed: 10/22/2018          Codes Class Noted    ICD (implantable cardioverter-defibrillator) in place ICD-10-CM: Z95.810  ICD-9-CM: V45.02  2018        Statin intolerance ICD-10-CM: Z78.9  ICD-9-CM: 995.27  10/4/2016        DM type 2 causing vascular disease ([facility] ICD-10-CM: E11.59  ICD-9-CM: 250.70, 443.81  2016        Debilitated patient ICD-10-CM: R53.81  ICD-9-CM: 799.3  2016        History of CVA (cerebrovascular accident) ICD-10-CM: Z86.73  ICD-9-CM: V12.54  3/28/2016        Dyslipidemia ICD-10-CM: E78.5  ICD-9-CM: 272.4  2016    Overview Signed 2016  1:21 PM by MD RIVKA La. FLP (3/24/16): Tot 207, , HDL 32,  (no Rx). Depression ICD-10-CM: F32.9  ICD-9-CM: 705  2015        CAD (coronary artery disease) ICD-10-CM: I25.10  ICD-9-CM: 414.00  Unknown        Ischemic cardiomyopathy ICD-10-CM: I25.5  ICD-9-CM: 414.8  Unknown    Overview Addendum 2017  2:33 PM by MD RIVKA La.   LVEF=40%. B.  Cath (3/23/16):  LAD p100. LCx m90; OM1 50. RCA p80, d80. EF 10% with severe GHK and apical AK. No AVG/MR. Patent L SC.  C.  Echo (3/24/16):  EF 20%. Mild to mod dil LA. Dil RA. Mild TR/WY. D.  S/P PPM/ICD (3/25/16, Ramonita): [facility].  E.  Echo (9/12/16): EF 15%. Sev dil LA. Dil RA. Mild MR. Mild to mod TR. PASP 32. Atrial fibrillation ([facility] ICD-10-CM: I48.91  ICD-9-CM: 427.31  Unknown                 Plan:     1. CAD/ischemic cardiomyopathy/EF 15%: Is not on beta-blocker or ACE inhibitor because of his marginal blood pressure. Primary prevention by AICD. AICD checks per Dr. Betsey Hussein. Continue aspirin. 2. Chronic atrial fibrillation: Rate is intrinsically controlled. Continue warfarin. INR is being managed by his primary care physician's office. 3. Statin intolerant. 4. See Dr. Pj Rodriguez in 6 months. Subjective:     Chaya Merida, a 70y.o. year-old who presents for followup. He has previously seen Dr. Mary Pierre.  He is returning for one-year follow-up. He has known history of CAD, ischemic cardiomyopathy status post AICD placement in March 2016. His last cardiac catheterization in March 2016 demonstrated 3 vessel disease. He was medically managed because of his significant cardiomyopathy. Currently he denies any symptoms of chest pain, shortness of breath, lightheadedness, dizziness, presyncope or syncope. There is no peripheral edema. He is wheelchair-bound mostly because of his prior stroke in 2014. EKG from May 2018 demonstrated atrial fibrillation with controlled heart rate. Exam:     Physical Exam:  Visit Vitals  /74   Pulse 80   Resp 16   Ht 5' 11\" (1.803 m)   Wt 142 lb 6.4 oz (64.6 kg)   SpO2 100%   BMI 19.86 kg/m²     General appearance - alert, well appearing, and in no distress  Mental status - affect appropriate to mood  Eyes - sclera anicteric, moist mucous membranes  Neck - supple, no significant adenopathy  Chest - clear to auscultation, no wheezes, rales or rhonchi  Heart - normal rate, regular rhythm, normal S1, S2, no murmurs, rubs, clicks or gallops  Abdomen - soft, nontender, nondistended, no masses or organomegaly  Extremities - peripheral pulses normal, no pedal edema  Skin - normal coloration  no rashes    Medications:     Current Outpatient Medications   Medication Sig    folic acid 875 mcg tablet Take 400 mcg by mouth daily.  ferrous sulfate (IRON) 325 mg (65 mg iron) tablet Take  by mouth Daily (before breakfast).  cetirizine (ZYRTEC) 10 mg tablet Take  by mouth.  melatonin 3 mg tablet Take  by mouth.  multivitamins chew Take  by mouth daily.  cholecalciferol, VITAMIN D3, (VITAMIN D3) 5,000 unit tab tablet Take  by mouth daily.     ascorbic acid, vitamin C, (VITAMIN C) 500 mg chew Take 500 mg by mouth daily.    traMADol (ULTRAM) 50 mg tablet take 1 tablet by mouth every 6 hours if needed for pain    warfarin (COUMADIN) 5 mg tablet take 1 tablet by mouth once daily except none on Sunday (Patient taking differently: 7.5 mg daily. take 1 tablet by mouth once daily except none on Sunday)    albuterol (PROVENTIL HFA, VENTOLIN HFA, PROAIR HFA) 90 mcg/actuation inhaler Take 2 Puffs by inhalation every four (4) hours as needed for Wheezing.  inhalational spacing device 1 Each by Does Not Apply route as needed.  aspirin delayed-release 81 mg tablet Take 81 mg by mouth every evening.  fluticasone (FLONASE) 50 mcg/actuation nasal spray 1-2 Sprays by Both Nostrils route daily as needed for Rhinitis.  clotrimazole-betamethasone (LOTRISONE) topical cream Apply  to affected area daily as needed for Skin Irritation (on feet).  glucose blood VI test strips (CONTOUR NEXT STRIPS) strip E11.9 daily     No current facility-administered medications for this visit. Diagnostic Data Review:       ECHO 3/23/2016: EF 20%, best fn inferolateral wall. LAE (4.8), KAREN, mild MR   CATH: 3/25/2016: Obstructive 3VD:pLAD 100, LCx m90; OM1 50. RCA p80 (diffuse), d80 (diffuse). EF 10%, Severe GHK with apical AK.  3/25/16: [employer]- single-chamber ICD. VVI- 60. - Ramonita  9/12/16-ECHO: EF 15%, akinesis of the basal-mid anteroseptal, basal-mid inferoseptal, basal-mid inferior, and apical wall(s).  Severe LAE, Mild MR, Mild-Mod TR, PASP 32 mmHg      Lab Review:     Lab Results   Component Value Date/Time    Cholesterol, total 194 10/04/2016 02:14 PM    HDL Cholesterol 27 (L) 10/04/2016 02:14 PM    LDL, calculated 125 (H) 10/04/2016 02:14 PM    Triglyceride 212 (H) 10/04/2016 02:14 PM    CHOL/HDL Ratio 6.5 (H) 03/24/2016 04:15 AM     Lab Results   Component Value Date/Time    Creatinine 0.78 04/02/2017 02:51 PM     Lab Results   Component Value Date/Time    BUN 11 04/02/2017 02:51 PM     Lab Results   Component Value Date/Time Potassium 3.9 04/02/2017 02:51 PM     Lab Results   Component Value Date/Time    Hemoglobin A1c 6.4 (H) 02/06/2017 11:44 AM     Lab Results   Component Value Date/Time    HGB 13.3 04/02/2017 02:51 PM     Lab Results   Component Value Date/Time    PLATELET 510 (L) 31/97/3429 02:51 PM     No results for input(s): CPK, CKMB, TROIQ in the last 72 hours. No lab exists for component: CKQMB, CPKMB             ___________________________________________________    Chas Nurse.  Katie Hughes MD, [facility] [place]

## 2018-10-22 NOTE — PROGRESS NOTES
Room # 9  Complains of fatigue and lack of energy. Wants to discuss afib treatment options.   Unable to assess oxygen level via pulse oximeter

## 2019-02-07 ENCOUNTER — OFFICE VISIT (OUTPATIENT)
Dept: CARDIOLOGY CLINIC | Age: 64
End: 2019-02-07

## 2019-02-07 DIAGNOSIS — Z95.810 ICD (IMPLANTABLE CARDIOVERTER-DEFIBRILLATOR) IN PLACE: Primary | ICD-10-CM

## 2019-05-23 ENCOUNTER — OFFICE VISIT (OUTPATIENT)
Dept: CARDIOLOGY CLINIC | Age: 64
End: 2019-05-23

## 2019-05-23 DIAGNOSIS — Z95.810 ICD (IMPLANTABLE CARDIOVERTER-DEFIBRILLATOR) IN PLACE: Primary | ICD-10-CM

## 2019-08-06 ENCOUNTER — TELEPHONE (OUTPATIENT)
Dept: CARDIOLOGY CLINIC | Age: 64
End: 2019-08-06

## 2019-08-06 NOTE — TELEPHONE ENCOUNTER
Patient's girlfriend, Stephon Obrien, states patient was taken by rescue squad to hospital 2 days ago and was admitted to [facility] in [place], [place]. She states she wanted to make Dr. David Lozano aware that he is in bad shape and that the doctors there have said he is in kidney failure. She states she is working on getting him transferred to [facility], but hasn't been successful.      Phone: [phone]